# Patient Record
Sex: FEMALE | Race: WHITE | NOT HISPANIC OR LATINO | Employment: OTHER | URBAN - METROPOLITAN AREA
[De-identification: names, ages, dates, MRNs, and addresses within clinical notes are randomized per-mention and may not be internally consistent; named-entity substitution may affect disease eponyms.]

---

## 2017-01-20 PROBLEM — M54.50 CHRONIC BILATERAL LOW BACK PAIN WITHOUT SCIATICA: Status: ACTIVE | Noted: 2017-01-20

## 2017-01-20 PROBLEM — G89.29 CHRONIC BILATERAL LOW BACK PAIN WITHOUT SCIATICA: Status: ACTIVE | Noted: 2017-01-20

## 2017-10-27 ENCOUNTER — TELEPHONE (OUTPATIENT)
Dept: NEUROSURGERY | Facility: CLINIC | Age: 82
End: 2017-10-27

## 2017-10-27 NOTE — TELEPHONE ENCOUNTER
Steven from [facility] [phone] called. Needs follow-up appointment with Dr. Lin. Pt. is being discharged this afternoon. Please call with follow-up appointment so that they can give to patient before discharge.

## 2017-11-22 ENCOUNTER — OFFICE VISIT (OUTPATIENT)
Dept: NEUROSURGERY | Facility: CLINIC | Age: 82
End: 2017-11-22
Payer: MEDICARE

## 2017-11-22 VITALS
SYSTOLIC BLOOD PRESSURE: 135 MMHG | DIASTOLIC BLOOD PRESSURE: 77 MMHG | HEIGHT: 68 IN | HEART RATE: 82 BPM | WEIGHT: 158.19 LBS | BODY MASS INDEX: 23.97 KG/M2

## 2017-11-22 DIAGNOSIS — D32.0 INTRACRANIAL MENINGIOMA: ICD-10-CM

## 2017-11-22 DIAGNOSIS — G93.89 SUPRASELLAR MASS: ICD-10-CM

## 2017-11-22 DIAGNOSIS — D49.7 PITUITARY TUMOR: ICD-10-CM

## 2017-11-22 DIAGNOSIS — F41.9 ANXIETY: ICD-10-CM

## 2017-11-22 DIAGNOSIS — H50.16 EXOTROPIA, ALTERNATING, WITH A PATTERN: Primary | ICD-10-CM

## 2017-11-22 DIAGNOSIS — M54.50 CHRONIC BILATERAL LOW BACK PAIN WITHOUT SCIATICA: ICD-10-CM

## 2017-11-22 DIAGNOSIS — G89.29 CHRONIC BILATERAL LOW BACK PAIN WITHOUT SCIATICA: ICD-10-CM

## 2017-11-22 PROCEDURE — 99215 OFFICE O/P EST HI 40 MIN: CPT | Mod: S$PBB,,, | Performed by: NEUROLOGICAL SURGERY

## 2017-11-22 PROCEDURE — 99999 PR PBB SHADOW E&M-EST. PATIENT-LVL III: CPT | Mod: PBBFAC,,, | Performed by: NEUROLOGICAL SURGERY

## 2017-11-22 PROCEDURE — 99213 OFFICE O/P EST LOW 20 MIN: CPT | Mod: PBBFAC,PN | Performed by: NEUROLOGICAL SURGERY

## 2017-11-22 NOTE — PROGRESS NOTES
Neurosurgery Outpatient Follow Up    Patient ID: Remy Edmond is a 82 y.o. female.    Chief Complaint   Patient presents with    Follow-up     Pt            Review of Systems   Constitutional: Negative for activity change, appetite change, chills, fever and unexpected weight change.   HENT: Negative for tinnitus, trouble swallowing and voice change.    Respiratory: Negative for apnea, cough, chest tightness and shortness of breath.    Cardiovascular: Negative for chest pain and palpitations.   Gastrointestinal: Negative for constipation, diarrhea, nausea and vomiting.   Endocrine:        Thinning hair   Genitourinary: Negative for difficulty urinating, dysuria, frequency and urgency.   Musculoskeletal: Negative for back pain, gait problem, neck pain and neck stiffness.   Skin: Negative for wound.   Neurological: Positive for headaches. Negative for dizziness, tremors, seizures, facial asymmetry, speech difficulty, weakness, light-headedness and numbness.   Psychiatric/Behavioral: Positive for sleep disturbance. Negative for confusion and decreased concentration. The patient is nervous/anxious.        Past Medical History:   Diagnosis Date    Anxiety     Arthritis     GERD (gastroesophageal reflux disease)     Hyperlipidemia     Hypertension     Irritable bladder     Osteopenia 9/12/2016    Plantar fascial fibromatosis     Psychosis     [facility]    Thyroid disease     Vitamin deficiency      Social History     Social History    Marital status: Unknown     Spouse name: N/A    Number of children: N/A    Years of education: N/A     Occupational History    Not on file.     Social History Main Topics    Smoking status: Never Smoker    Smokeless tobacco: Never Used    Alcohol use No    Drug use: No    Sexual activity: Not on file     Other Topics Concern    Not on file     Social History Narrative    No narrative on file     Family History   Problem Relation  "Age of Onset    Hypertension Father     Anuerysm Mother     COPD Brother     Cancer Brother     Amblyopia Neg Hx     Blindness Neg Hx     Cataracts Neg Hx     Glaucoma Neg Hx     Macular degeneration Neg Hx     Retinal detachment Neg Hx     Strabismus Neg Hx      Review of patient's allergies indicates:   Allergen Reactions    Codeine sulfate     Lortab [hydrocodone-acetaminophen]     Morphine     Penicillins     Statins-hmg-coa reductase inhibitors     Tekturna [aliskiren]        Blood pressure 135/77, pulse 82, height 5' 8" (1.727 m), weight 71.7 kg (158 lb 2.9 oz).      Neurologic Exam     Mental Status   Oriented to person, place, and time.   Follows 1 step commands.   Attention: decreased. Concentration: decreased.   Speech: speech is normal   Level of consciousness: alert  Knowledge: consistent with education and poor.   Able to name object. Abnormal comprehension.   Mild cognitive dementia     Cranial Nerves     CN II   Visual acuity: decreased  Right visual field deficit: none  Left visual field deficit: none     CN III, IV, VI   Pupils are equal, round, and reactive to light.  Right pupil: Size: 3 mm. Shape: regular. Reactivity: brisk. Consensual response: intact. Accommodation: intact.   Left pupil: Size: 3 mm. Shape: regular. Reactivity: brisk. Consensual response: intact. Accommodation: intact.   Nystagmus: none   Diplopia: none  Ophthalmoparesis: none  Upgaze: abnormal  Downgaze: abnormal  Conjugate gaze: absent    CN V   Right facial sensation deficit: none  Left facial sensation deficit: none    CN VII   Right facial weakness: none  Left facial weakness: none    CN VIII   Hearing: intact    CN IX, X   CN IX normal.   CN X normal.     CN XI   Right sternocleidomastoid strength: normal  Left sternocleidomastoid strength: normal  Right trapezius strength: normal  Left trapezius strength: normal    CN XII   Fasciculations: absent  Tongue deviation: none    Motor Exam   Muscle bulk: " normal  Overall muscle tone: normal  Right arm pronator drift: absent  Left arm pronator drift: absent    Strength   Right neck flexion: 5/5  Left neck flexion: 5/5  Right neck extension: 5/5  Left neck extension: 5/5  Right deltoid: 5/5  Left deltoid: 5/5  Right biceps: 5/5  Left biceps: 5/5  Right triceps: 5/5  Left triceps: 5/5  Right wrist flexion: 5/5  Left wrist flexion: 5/5  Right wrist extension: 5/5  Left wrist extension: 5/5  Right interossei: 5/5  Left interossei: 5/5  Right abdominals: 5/5  Left abdominals: 5/5  Right iliopsoas: 5/5  Left iliopsoas: 5/5  Right quadriceps: 5/5  Left quadriceps: 5/5  Right hamstrin/5  Left hamstrin/5  Right glutei: 5/5  Left glutei: 5/5  Right anterior tibial: 5/5  Left anterior tibial: 5/5  Right posterior tibial: 5/5  Left posterior tibial: 5/5  Right peroneal: 5/5  Left peroneal: 5/5  Right gastroc: 5/5  Left gastroc: 5/5    Sensory Exam   Light touch normal.   Vibration normal.   Pinprick normal.     Gait, Coordination, and Reflexes     Gait  Gait: normal    Coordination   Romberg: negative  Finger to nose coordination: normal  Heel to shin coordination: normal  Tandem walking coordination: normal    Tremor   Resting tremor: absent  Intention tremor: absent  Action tremor: absent    Reflexes   Right brachioradialis: 2+  Left brachioradialis: 2+  Right biceps: 2+  Left biceps: 2+  Right triceps: 2+  Left triceps: 2+  Right patellar: 2+  Left patellar: 2+  Right achilles: 2+  Left achilles: 2+  Right : 2+  Left : 2+  Right plantar: normal  Left plantar: normal  Right De La Cruz: absent  Left De La Cruz: absent  Right ankle clonus: absent  Left ankle clonus: absent      Physical Exam   Constitutional: She is oriented to person, place, and time. She appears well-developed and well-nourished.   HENT:   Head: Normocephalic and atraumatic.   Eyes: Pupils are equal, round, and reactive to light.   Proptosis OD  OD > OS deviation   Neck: Normal range of motion. Neck  supple.   Cardiovascular: Normal rate and intact distal pulses.    Pulmonary/Chest: Effort normal. No respiratory distress.   Abdominal: Soft. She exhibits no distension.   Musculoskeletal: Normal range of motion. She exhibits no edema or deformity.   Neurological: She is oriented to person, place, and time. She has a normal Finger-Nose-Finger Test, a normal Heel to Shin Test, a normal Romberg Test and a normal Tandem Gait Test. Gait normal.   Reflex Scores:       Tricep reflexes are 2+ on the right side and 2+ on the left side.       Bicep reflexes are 2+ on the right side and 2+ on the left side.       Brachioradialis reflexes are 2+ on the right side and 2+ on the left side.       Patellar reflexes are 2+ on the right side and 2+ on the left side.       Achilles reflexes are 2+ on the right side and 2+ on the left side.  Skin: Skin is warm and dry.   Psychiatric: She has a normal mood and affect. Her speech is normal and behavior is normal. Judgment and thought content normal.   Nursing note and vitals reviewed.      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 The patient has noticed increased visual loss and diplopia. She also suffers with frequent headaches, dizziness and unsteadiness. Over the past few years she has developed some cognitive decline and short term memory loss.    I have reviewed her vital signs and nursing notes. On examination she is  female who looks her stated age and is well appearing. Neurologically she is grossly intact with baseline dementia and unsteadiness. Her only positive finding is dysconjugate gaze and decreased visual acuity better characterized in the ophthalmology notes. She has mild hearing loss.    I have reviewed her new brain MRI which demonstrates a 2.5 cm right parasellar lesion extending  intracranially behind the right SOF. This appears to be a pituitary tumor and is unlikely to be a meningioma. In addition she has the known meningioma in the anterior fall measuring 1.5 cm with minimal mass effect on the surrounding brain. There are involutionary changes in the brain parenchyma with prominent microvascular lacunar infarcts in the basal ganglia bilaterally.    This elderly female who is very high functioning feels she is declining in vision and has increased headaches behind the right eye. Despite her age I believe it is reasonable to receopress the optic chiasm and at least debulk the parasellar/pituitary tumor.  We can follow that with stereotactic radiosurgery depending on the pathology.    Visit Diagnosis:  Exotropia, alternating, with A pattern    Chronic bilateral low back pain without sciatica    Anxiety    Intracranial meningioma    Pituitary tumor    Suprasellar mass      Total time spent counseling greater than fifty percent of total visit time.  Counseling included discussion regarding imaging findings, diagnosis possibilities, treatment options, risks and benefits.   The patient had many questions regarding the options and long-term effects.

## 2017-11-30 DIAGNOSIS — D49.7 PITUITARY TUMOR: Primary | ICD-10-CM

## 2017-12-15 ENCOUNTER — TELEPHONE (OUTPATIENT)
Dept: NEUROSURGERY | Facility: CLINIC | Age: 82
End: 2017-12-15

## 2017-12-15 NOTE — TELEPHONE ENCOUNTER
Pt called stating she will call to schedule her date for surgery and MRI needed when she is ready.

## 2019-01-07 ENCOUNTER — HOSPITAL ENCOUNTER (OUTPATIENT)
Dept: RADIOLOGY | Facility: HOSPITAL | Age: 84
Discharge: HOME | End: 2019-01-07
Attending: OBSTETRICS & GYNECOLOGY
Payer: MEDICARE

## 2019-01-07 ENCOUNTER — OFFICE VISIT (OUTPATIENT)
Dept: OBSTETRICS AND GYNECOLOGY | Facility: CLINIC | Age: 84
End: 2019-01-07
Payer: MEDICARE

## 2019-01-07 VITALS — HEIGHT: 67 IN | WEIGHT: 156 LBS | BODY MASS INDEX: 24.48 KG/M2

## 2019-01-07 VITALS
SYSTOLIC BLOOD PRESSURE: 122 MMHG | WEIGHT: 156.31 LBS | DIASTOLIC BLOOD PRESSURE: 64 MMHG | BODY MASS INDEX: 24.48 KG/M2

## 2019-01-07 DIAGNOSIS — Z12.39 BREAST CANCER SCREENING: ICD-10-CM

## 2019-01-07 DIAGNOSIS — Z12.39 BREAST CANCER SCREENING: Primary | ICD-10-CM

## 2019-01-07 DIAGNOSIS — N34.2 URETHRAL MEATITIS: ICD-10-CM

## 2019-01-07 DIAGNOSIS — Z01.411 ENCOUNTER FOR WELL WOMAN EXAM WITH ABNORMAL FINDINGS: ICD-10-CM

## 2019-01-07 PROCEDURE — 77063 BREAST TOMOSYNTHESIS BI: CPT | Mod: 26,,, | Performed by: RADIOLOGY

## 2019-01-07 PROCEDURE — 77067 SCR MAMMO BI INCL CAD: CPT | Mod: 26,,, | Performed by: RADIOLOGY

## 2019-01-07 PROCEDURE — 77063 MAMMO DIGITAL SCREENING BILAT WITH TOMOSYNTHESIS_CAD: ICD-10-PCS | Mod: 26,,, | Performed by: RADIOLOGY

## 2019-01-07 PROCEDURE — 99999 PR PBB SHADOW E&M-EST. PATIENT-LVL IV: CPT | Mod: PBBFAC,,, | Performed by: OBSTETRICS & GYNECOLOGY

## 2019-01-07 PROCEDURE — 77067 SCR MAMMO BI INCL CAD: CPT | Mod: TC,PN

## 2019-01-07 PROCEDURE — 99204 PR OFFICE/OUTPT VISIT, NEW, LEVL IV, 45-59 MIN: ICD-10-PCS | Mod: S$PBB,,, | Performed by: OBSTETRICS & GYNECOLOGY

## 2019-01-07 PROCEDURE — 99999 PR PBB SHADOW E&M-EST. PATIENT-LVL IV: ICD-10-PCS | Mod: PBBFAC,,, | Performed by: OBSTETRICS & GYNECOLOGY

## 2019-01-07 PROCEDURE — 99204 OFFICE O/P NEW MOD 45 MIN: CPT | Mod: S$PBB,,, | Performed by: OBSTETRICS & GYNECOLOGY

## 2019-01-07 PROCEDURE — 99214 OFFICE O/P EST MOD 30 MIN: CPT | Mod: PBBFAC,PN | Performed by: OBSTETRICS & GYNECOLOGY

## 2019-01-07 PROCEDURE — 77067 MAMMO DIGITAL SCREENING BILAT WITH TOMOSYNTHESIS_CAD: ICD-10-PCS | Mod: 26,,, | Performed by: RADIOLOGY

## 2019-01-07 NOTE — PROGRESS NOTES
Subjective:       Patient ID: Remy Edmond is a 83 y.o. female.    Chief Complaint:  Vaginal Prolapse      History of Present Illness  HPI  83 y.o.  with complaint of tender mass at entrance to vaginal canal. This was first noted 2 weeks ago. Patient reports having had a hysterectomy and possibly also BSO. Patient is a poor historian. Patient reports no dysuria or change in bladder function.    GYN & OB History  No LMP recorded. Patient has had a hysterectomy.   Date of Last Pap: No result found    OB History    Para Term  AB Living   1 1       1   SAB TAB Ectopic Multiple Live Births                  # Outcome Date GA Lbr Thom/2nd Weight Sex Delivery Anes PTL Lv   1 Para                   Review of Systems  Review of Systems   Constitutional: Negative for activity change, appetite change, chills, diaphoresis, fatigue, fever and unexpected weight change.   HENT: Negative for nasal congestion, mouth sores and tinnitus.    Eyes: Negative for discharge and visual disturbance.   Respiratory: Negative for cough, shortness of breath and wheezing.    Cardiovascular: Negative for chest pain, palpitations and leg swelling.   Gastrointestinal: Negative for abdominal pain, bloating, blood in stool, constipation, diarrhea, nausea, vomiting, reflux and fecal incontinence.   Endocrine: Positive for hypothyroidism. Negative for diabetes, hair loss, hot flashes and hyperthyroidism.   Genitourinary: Negative for bladder incontinence, decreased libido, dyspareunia, dysuria, flank pain, frequency, genital sores, hematuria, menorrhagia, menstrual problem, pelvic pain, urgency, vaginal bleeding, vaginal discharge, vaginal pain, dysmenorrhea, urinary incontinence, postcoital bleeding, postmenopausal bleeding and vaginal odor.   Musculoskeletal: Negative for arthralgias, back pain, joint swelling, leg pain and myalgias.   Neurological: Negative for vertigo, seizures, syncope, numbness and headaches.   Hematological:  Negative for adenopathy. Does not bruise/bleed easily.   Psychiatric/Behavioral: Negative for depression and sleep disturbance. The patient is not nervous/anxious.    Breast: Negative for asymmetry, breast self exam, lump, mass, mastodynia, nipple discharge, skin changes and tenderness          Objective:    Physical Exam:   Constitutional: She is oriented to person, place, and time. She appears well-developed and well-nourished. No distress.    HENT:   Head: Normocephalic.   Nose: No epistaxis.     Neck: Normal range of motion.    Cardiovascular: Normal rate.     Pulmonary/Chest: Effort normal.   Breasts: no palpable masses or nipple discharge.        Abdominal: Soft. She exhibits distension. She exhibits no mass. There is no guarding.     Genitourinary:   Genitourinary Comments: No lesions noted on external genitalia. There is apparent edema and erythema of urethral meatus. Vaginal canal is atrophic but well supported.           Musculoskeletal: Normal range of motion and moves all extremeties.       Neurological: She is alert and oriented to person, place, and time.    Skin: Skin is warm and dry.    Psychiatric: She has a normal mood and affect. Her behavior is normal.          Assessment:        1. Encounter for well woman exam with abnormal findings    2. Urethral meatitis                Plan:      Mammogram today  Hydrocortisone cream to urethral meatus and referral to Urogynecology for evaluation

## 2019-01-07 NOTE — LETTER
January 7, 2019      Amandeep Vieira MD  [address]  [facility] [facility] [address]           [facility]  [address] [place] [address]  Phone: [phone]  Fax: [phone]          Patient: Remy Edmond   MR Number: [id number]   YOB: 1935   Date of Visit: 1/7/2019       Dear Dr. Amandeep Vieira:    Thank you for referring Remy Edmond to me for evaluation. Attached you will find relevant portions of my assessment and plan of care.    If you have questions, please do not hesitate to call me. I look forward to following Remy Edmond along with you.    Sincerely,    Wade Rogers MD    Enclosure  CC:  No Recipients    If you would like to receive this communication electronically, please contact [email or web address] or [phone] to request more information on [facility] assistant Link access.    For providers and/or their staff who would like to refer a patient to [facility], please contact us through our one-stop-shop provider referral line, [facility], at [phone].    If you feel you have received this communication in error or would no longer like to receive these types of communications, please e-mail [email or web address]

## 2019-01-11 ENCOUNTER — TELEPHONE (OUTPATIENT)
Dept: RADIOLOGY | Facility: HOSPITAL | Age: 84
End: 2019-01-11

## 2019-01-30 ENCOUNTER — TELEPHONE (OUTPATIENT)
Dept: RADIOLOGY | Facility: HOSPITAL | Age: 84
End: 2019-01-30

## 2019-02-07 ENCOUNTER — HOSPITAL ENCOUNTER (OUTPATIENT)
Dept: RADIOLOGY | Facility: HOSPITAL | Age: 84
Discharge: HOME | End: 2019-02-07
Attending: OBSTETRICS & GYNECOLOGY
Payer: MEDICARE

## 2019-02-07 ENCOUNTER — TELEPHONE (OUTPATIENT)
Dept: RADIOLOGY | Facility: HOSPITAL | Age: 84
End: 2019-02-07

## 2019-02-07 DIAGNOSIS — R92.8 ABNORMAL MAMMOGRAM OF RIGHT BREAST: ICD-10-CM

## 2019-02-07 PROCEDURE — 77065 DX MAMMO INCL CAD UNI: CPT | Mod: TC,PO

## 2019-02-07 PROCEDURE — 76642 ULTRASOUND BREAST LIMITED: CPT | Mod: 26,RT,, | Performed by: RADIOLOGY

## 2019-02-07 PROCEDURE — 77065 DX MAMMO INCL CAD UNI: CPT | Mod: 26,,, | Performed by: RADIOLOGY

## 2019-02-07 PROCEDURE — 77061 BREAST TOMOSYNTHESIS UNI: CPT | Mod: TC,PO

## 2019-02-07 PROCEDURE — 77061 BREAST TOMOSYNTHESIS UNI: CPT | Mod: 26,,, | Performed by: RADIOLOGY

## 2019-02-07 PROCEDURE — 77061 MAMMO DIGITAL DIAGNOSTIC RIGHT WITH TOMOSYNTHESIS_CAD: ICD-10-PCS | Mod: 26,,, | Performed by: RADIOLOGY

## 2019-02-07 PROCEDURE — 76642 US BREAST RIGHT LIMITED: ICD-10-PCS | Mod: 26,RT,, | Performed by: RADIOLOGY

## 2019-02-07 PROCEDURE — 77065 MAMMO DIGITAL DIAGNOSTIC RIGHT WITH TOMOSYNTHESIS_CAD: ICD-10-PCS | Mod: 26,,, | Performed by: RADIOLOGY

## 2019-02-07 PROCEDURE — 76642 ULTRASOUND BREAST LIMITED: CPT | Mod: TC,PO,RT

## 2019-02-07 NOTE — TELEPHONE ENCOUNTER
..Patient notified of diagnostic mammogram results.  Right breast biopsy is scheduled on 2/14/2019

## 2019-02-14 ENCOUNTER — HOSPITAL ENCOUNTER (OUTPATIENT)
Dept: RADIOLOGY | Facility: HOSPITAL | Age: 84
Discharge: HOME | End: 2019-02-14
Attending: OBSTETRICS & GYNECOLOGY
Payer: MEDICARE

## 2019-02-14 DIAGNOSIS — R92.8 ABNORMAL MAMMOGRAM OF RIGHT BREAST: ICD-10-CM

## 2019-02-14 PROCEDURE — 88341 IMHCHEM/IMCYTCHM EA ADD ANTB: CPT | Mod: 26,59,, | Performed by: PATHOLOGY

## 2019-02-14 PROCEDURE — A4648 IMPLANTABLE TISSUE MARKER: HCPCS | Mod: PO

## 2019-02-14 PROCEDURE — 19083 US BREAST BIOPSY WITH IMAGING 1ST SITE RIGHT: ICD-10-PCS | Mod: RT,,, | Performed by: RADIOLOGY

## 2019-02-14 PROCEDURE — 19084 US BREAST BIOPSY WITH IMAGING EA ADDITIONAL: ICD-10-PCS | Mod: RT,,, | Performed by: RADIOLOGY

## 2019-02-14 PROCEDURE — 88360 TUMOR IMMUNOHISTOCHEM/MANUAL: CPT | Mod: 59 | Performed by: PATHOLOGY

## 2019-02-14 PROCEDURE — 88305 TISSUE SPECIMEN TO PATHOLOGY, RADIOLOGY: ICD-10-PCS | Mod: 26,,, | Performed by: PATHOLOGY

## 2019-02-14 PROCEDURE — 27201044 US BREAST BIOPSY WITH IMAGING EA ADDITIONAL: Mod: PO

## 2019-02-14 PROCEDURE — 27201044 US BREAST BIOPSY WITH IMAGING 1ST SITE RIGHT: Mod: PO

## 2019-02-14 PROCEDURE — 88341 PR IHC OR ICC EACH ADD'L SINGLE ANTIBODY  STAINPR: ICD-10-PCS | Mod: 26,59,, | Performed by: PATHOLOGY

## 2019-02-14 PROCEDURE — 88342 IMHCHEM/IMCYTCHM 1ST ANTB: CPT | Mod: 26,59,, | Performed by: PATHOLOGY

## 2019-02-14 PROCEDURE — 88360 TUMOR IMMUNOHISTOCHEM/MANUAL: CPT | Mod: 26,,, | Performed by: PATHOLOGY

## 2019-02-14 PROCEDURE — 19083 BX BREAST 1ST LESION US IMAG: CPT | Mod: RT,,, | Performed by: RADIOLOGY

## 2019-02-14 PROCEDURE — 88342 TISSUE SPECIMEN TO PATHOLOGY, RADIOLOGY: ICD-10-PCS | Mod: 26,59,, | Performed by: PATHOLOGY

## 2019-02-14 PROCEDURE — 88305 TISSUE EXAM BY PATHOLOGIST: CPT | Mod: 26,,, | Performed by: PATHOLOGY

## 2019-02-14 PROCEDURE — 88360 PR  TUMOR IMMUNOHISTOCHEM/MANUAL: ICD-10-PCS | Mod: 26,,, | Performed by: PATHOLOGY

## 2019-02-14 PROCEDURE — 88305 TISSUE EXAM BY PATHOLOGIST: CPT | Performed by: PATHOLOGY

## 2019-02-14 PROCEDURE — 19084 BX BREAST ADD LESION US IMAG: CPT | Mod: RT,,, | Performed by: RADIOLOGY

## 2019-02-22 ENCOUNTER — TELEPHONE (OUTPATIENT)
Dept: RADIOLOGY | Facility: HOSPITAL | Age: 84
End: 2019-02-22

## 2019-02-25 NOTE — TELEPHONE ENCOUNTER
Patient notified of right breast biopsy    FINAL PATHOLOGIC DIAGNOSIS  1. RIGHT BREAST MASS, 12:00, 1 CM FROM NIPPLE, BIOPSY:  -Invasive ductal carcinoma, combined grade 1  Tubule formation: Score 2  Nuclear pleomorphism: Score 2  Mitotic rate: score 1  -Largest continuous tumor measurement on this biopsy is 4 mm  -Immunohistochemical stain for P63 performed with adequate controls supports the diagnosis.  -Immunohistochemical stains for hormone receptors performed with adequate controls show the following results:  Estrogen receptor: Positive, strong staining in more than 90% of tumor cell nuclei.  Progesterone receptor: Positive, strong staining in more than 90% of tumor cell nuclei.  Her2: Negative, score 1+  Ki67: 9%  2. RIGHT BREAST MASS, 12:00, 6 CM FROM NIPPLE, BIOPSY:  -Invasive ductal carcinoma, combined grade 2  Tubule formation: Score 3  Nuclear pleomorphism: Score 2  Mitotic rate: score 1  -Largest continuous tumor measurement on this biopsy is 6 mm  -Immunohistochemical stains for hormone receptors performed with adequate controls show the following results:  Estrogen receptor: Positive, strong staining in more than 90% of tumor cell nuclei.  Progesterone receptor: Positive, strong staining in more than 90% of tumor cell nuclei.  Her2: Negative, score 1+  XRK8DOG,ER,PGR,CTS8ZEB,ER,PGR,QUT6CCP,ER,PGR  Report continued on next page Page 1 of 3  Patient Name BUFFY GREY Accession # [id number]  (83Y F)  Location  Date of Birth  [id number]  1935  Billing #  Collection Date  Received  Reported  Medical Record #  02/14/2019  02/15/2019  02/21/2019  [facility]  [id number]  Ki67: 25%  3. RIGHT BREAST MASS, 9:00, 8 CM FROM NIPPLE, BIOPSY:  -Invasive ductal carcinoma, combined grade 1  Tubule formation: Score 2  Nuclear pleomorphism: Score 2  Mitotic rate: score 1  -Largest continuous tumor measurement on this biopsy is 6 mm.  -Immunohistochemical stains for hormone receptors performed with adequate  controls show the following results:  Estrogen receptor: Positive, strong staining in more than 90% of tumor cell nuclei.  Progesterone receptor: Positive, strong staining in more than 90% of tumor cell nuclei.  Her2: Negative, score 1+  Ki67: 5%    Appointment scheduled with Dr Ma on 3/11/2019

## 2019-03-11 ENCOUNTER — OFFICE VISIT (OUTPATIENT)
Dept: SURGERY | Facility: CLINIC | Age: 84
End: 2019-03-11
Payer: MEDICARE

## 2019-03-11 VITALS
HEART RATE: 67 BPM | BODY MASS INDEX: 23.45 KG/M2 | DIASTOLIC BLOOD PRESSURE: 73 MMHG | WEIGHT: 149.69 LBS | SYSTOLIC BLOOD PRESSURE: 159 MMHG

## 2019-03-11 DIAGNOSIS — C50.911 BREAST CANCER, RIGHT BREAST: ICD-10-CM

## 2019-03-11 DIAGNOSIS — Z01.818 PREOP TESTING: ICD-10-CM

## 2019-03-11 DIAGNOSIS — C50.911 MALIGNANT NEOPLASM OF RIGHT FEMALE BREAST, UNSPECIFIED ESTROGEN RECEPTOR STATUS, UNSPECIFIED SITE OF BREAST: Primary | ICD-10-CM

## 2019-03-11 PROCEDURE — 99999 PR PBB SHADOW E&M-EST. PATIENT-LVL IV: CPT | Mod: PBBFAC,,, | Performed by: SURGERY

## 2019-03-11 PROCEDURE — 99204 OFFICE O/P NEW MOD 45 MIN: CPT | Mod: S$PBB,,, | Performed by: SURGERY

## 2019-03-11 PROCEDURE — 99999 PR PBB SHADOW E&M-EST. PATIENT-LVL IV: ICD-10-PCS | Mod: PBBFAC,,, | Performed by: SURGERY

## 2019-03-11 PROCEDURE — 99204 PR OFFICE/OUTPT VISIT, NEW, LEVL IV, 45-59 MIN: ICD-10-PCS | Mod: S$PBB,,, | Performed by: SURGERY

## 2019-03-11 PROCEDURE — 99214 OFFICE O/P EST MOD 30 MIN: CPT | Mod: PBBFAC,PO | Performed by: SURGERY

## 2019-03-11 NOTE — LETTER
March 18, 2019      Wade Rogers MD  [address]  [place] [facility] [address]           [facility]  [address] [facility] [address] [place] [address]  Phone: [phone]          Patient: Remy Edmond   MR Number: [id number]   YOB: 1935   Date of Visit: 3/11/2019       Dear Dr. Wade Rogers:    Thank you for referring Remy Edmond to me for evaluation. Attached you will find relevant portions of my assessment and plan of care.    If you have questions, please do not hesitate to call me. I look forward to following Remy Edmond along with you.    Sincerely,    Porter Ma MD    Enclosure  CC:  No Recipients    If you would like to receive this communication electronically, please contact [email or web address] or [phone] to request more information on [facility] Link access.    For providers and/or their staff who would like to refer a patient to [facility], please contact us through our one-stop-shop provider referral line, [facility], at [phone].    If you feel you have received this communication in error or would no longer like to receive these types of communications, please e-mail [email or web address]

## 2019-03-12 ENCOUNTER — TELEPHONE (OUTPATIENT)
Dept: SURGERY | Facility: CLINIC | Age: 84
End: 2019-03-12

## 2019-03-12 NOTE — TELEPHONE ENCOUNTER
----- Message from Astrid Szymanski sent at 3/12/2019  1:37 PM CDT -----  Type: Needs Medical Advice  Who Called:  Self   Symptoms (please be specific):  NA   How long has patient had these symptoms:  ROBERT  Pharmacy name and phone #:  ROBERT  Best Call Back Number:[phone]  Additional Information:patient asking to speak with the nurse

## 2019-03-13 RX ORDER — SODIUM CHLORIDE 9 MG/ML
INJECTION, SOLUTION INTRAVENOUS CONTINUOUS
Status: CANCELLED | OUTPATIENT
Start: 2019-03-29

## 2019-03-13 RX ORDER — LIDOCAINE HYDROCHLORIDE 10 MG/ML
1 INJECTION, SOLUTION EPIDURAL; INFILTRATION; INTRACAUDAL; PERINEURAL ONCE
Status: CANCELLED | OUTPATIENT
Start: 2019-03-29

## 2019-03-18 ENCOUNTER — LAB VISIT (OUTPATIENT)
Dept: LAB | Facility: HOSPITAL | Age: 84
End: 2019-03-18
Attending: SURGERY
Payer: MEDICARE

## 2019-03-18 DIAGNOSIS — Z01.818 PREOP TESTING: ICD-10-CM

## 2019-03-18 LAB
ALBUMIN SERPL BCP-MCNC: 3.8 G/DL
ALP SERPL-CCNC: 82 U/L
ALT SERPL W/O P-5'-P-CCNC: 24 U/L
ANION GAP SERPL CALC-SCNC: 7 MMOL/L
AST SERPL-CCNC: 33 U/L
BASOPHILS # BLD AUTO: 0.04 K/UL
BASOPHILS NFR BLD: 0.7 %
BILIRUB SERPL-MCNC: 0.5 MG/DL
BUN SERPL-MCNC: 22 MG/DL
CALCIUM SERPL-MCNC: 9.3 MG/DL
CHLORIDE SERPL-SCNC: 106 MMOL/L
CO2 SERPL-SCNC: 31 MMOL/L
CREAT SERPL-MCNC: 1.1 MG/DL
DIFFERENTIAL METHOD: ABNORMAL
EOSINOPHIL # BLD AUTO: 0.2 K/UL
EOSINOPHIL NFR BLD: 2.9 %
ERYTHROCYTE [DISTWIDTH] IN BLOOD BY AUTOMATED COUNT: 12.9 %
EST. GFR  (AFRICAN AMERICAN): 54.4 ML/MIN/1.73 M^2
EST. GFR  (NON AFRICAN AMERICAN): 47.2 ML/MIN/1.73 M^2
GLUCOSE SERPL-MCNC: 101 MG/DL
HCT VFR BLD AUTO: 41.2 %
HGB BLD-MCNC: 13.5 G/DL
IMM GRANULOCYTES # BLD AUTO: 0.01 K/UL
IMM GRANULOCYTES NFR BLD AUTO: 0.2 %
LYMPHOCYTES # BLD AUTO: 1.7 K/UL
LYMPHOCYTES NFR BLD: 29 %
MCH RBC QN AUTO: 33.3 PG
MCHC RBC AUTO-ENTMCNC: 32.8 G/DL
MCV RBC AUTO: 102 FL
MONOCYTES # BLD AUTO: 0.5 K/UL
MONOCYTES NFR BLD: 8.3 %
NEUTROPHILS # BLD AUTO: 3.5 K/UL
NEUTROPHILS NFR BLD: 58.9 %
NRBC BLD-RTO: 0 /100 WBC
PLATELET # BLD AUTO: 227 K/UL
PMV BLD AUTO: 10.7 FL
POTASSIUM SERPL-SCNC: 4.2 MMOL/L
PROT SERPL-MCNC: 7.2 G/DL
RBC # BLD AUTO: 4.05 M/UL
SODIUM SERPL-SCNC: 144 MMOL/L
WBC # BLD AUTO: 5.93 K/UL

## 2019-03-18 PROCEDURE — 93010 ELECTROCARDIOGRAM REPORT: CPT | Mod: S$PBB,,, | Performed by: INTERNAL MEDICINE

## 2019-03-18 PROCEDURE — 93010 EKG 12-LEAD: ICD-10-PCS | Mod: S$PBB,,, | Performed by: INTERNAL MEDICINE

## 2019-03-18 PROCEDURE — 36415 COLL VENOUS BLD VENIPUNCTURE: CPT | Mod: PO

## 2019-03-18 PROCEDURE — 85025 COMPLETE CBC W/AUTO DIFF WBC: CPT

## 2019-03-18 PROCEDURE — 93005 ELECTROCARDIOGRAM TRACING: CPT | Mod: PBBFAC,PO | Performed by: INTERNAL MEDICINE

## 2019-03-18 PROCEDURE — 80053 COMPREHEN METABOLIC PANEL: CPT

## 2019-03-18 NOTE — H&P (VIEW-ONLY)
Subjective:       Patient ID: Remy Edmond is a 83 y.o. female.    Chief Complaint: Consult and Breast Problem      See      Pt with recently diagnosed multicentric invasive right breast cancer. No previous breast problems. Family history is negative. She has one child born when she was 30. She is here with her son.    Past Medical History:   Diagnosis Date    Anxiety     Arthritis     GERD (gastroesophageal reflux disease)     Hyperlipidemia     Hypertension     Irritable bladder     Osteopenia 9/12/2016    Plantar fascial fibromatosis     Psychosis     [facility]    Thyroid disease     Vitamin deficiency      Past Surgical History:   Procedure Laterality Date    APPENDECTOMY      BACK SURGERY      CATARACT EXTRACTION W/  INTRAOCULAR LENS IMPLANT Bilateral 1999    HYSTERECTOMY      TONSILLECTOMY           Current Outpatient Medications:     amlodipine-benazepril (LOTREL) 10-40 mg per capsule, TAKE 1 CAPSULE BY MOUTH ONCE DAILY PATIENT NEEDS AN APPOINTMENT FOR FURTHER REFILLS Disp: 30 capsule, Rfl: 5    atenolol (TENORMIN) 50 MG tablet, Take 50 mg by mouth once daily., Disp: , Rfl:     cholecalciferol, vitamin D3, (VITAMIN D3) 1,000 unit capsule, Take 1,000 Units by mouth once daily., Disp: , Rfl:     doxazosin (CARDURA) 1 MG tablet, TAKE 1 TABLET BY MOUTH IN THE EVENING, Disp: 30 tablet, Rfl: 5    gabapentin (NEURONTIN) 100 MG capsule, TAKE 1 CAPSULE BY MOUTH 4 TIMES DAILY, Disp: 120 capsule, Rfl: 5    naproxen sodium (ANAPROX) 550 MG tablet, Take 1 tablet (550 mg total) by mouth 2 (two) times daily with meals., Disp: 20 tablet, Rfl: 0    SYNTHROID 175 mcg tablet, TAKE 1 TABLET BY MOUTH ONCE DAILY, Disp: 30 tablet, Rfl: 3    atenolol (TENORMIN) 50 MG tablet, TAKE 1 TABLET BY MOUTH TWICE DAILY, Disp: 60 tablet, Rfl: 5    Review of patient's allergies indicates:   Allergen Reactions    Codeine sulfate     Lortab [hydrocodone-acetaminophen]     Morphine      Penicillins     Statins-hmg-coa reductase inhibitors     T[place]turna [aliskiren]        Family History   Problem Relation Age of Onset    Hypertension Father     Anuerysm Mother     COPD Brother     Cancer Brother     Amblyopia Neg Hx     Blindness Neg Hx     Cataracts Neg Hx     Glaucoma Neg Hx     Macular degeneration Neg Hx     Retinal detachment Neg Hx     Strabismus Neg Hx      Social History     Socioeconomic History    Marital status: Unknown     Spouse name: Not on file    Number of children: Not on file    Years of education: Not on file    Highest education level: Not on file   Social Needs    Financial resource strain: Not on file    Food insecurity - worry: Not on file    Food insecurity - inability: Not on file    Transportation needs - medical: Not on file    Transportation needs - non-medical: Not on file   Occupational History    Not on file   Tobacco Use    Smoking status: Never Smoker    Smokeless tobacco: Never Used   Substance and Sexual Activity    Alcohol use: No    Drug use: No    Sexual activity: Not on file   Other Topics Concern    Not on file   Social History Narrative    Not on file       Review of Systems   Constitutional: Negative for activity change, chills, fever and unexpected weight change.   HENT: Negative for congestion, sore throat, trouble swallowing and voice change.    Eyes: Negative for redness and visual disturbance.   Respiratory: Negative for cough, shortness of breath and wheezing.    Cardiovascular: Negative for chest pain and palpitations.   Gastrointestinal: Negative for abdominal pain, blood in stool, nausea and vomiting.   Endocrine: Negative.    Genitourinary: Negative for dysuria, frequency and hematuria.   Musculoskeletal: Negative for arthralgias, back pain and neck pain.   Skin: Negative for rash and wound.   Allergic/Immunologic: Negative.    Neurological: Negative for dizziness, weakness and headaches.   Hematological: Negative  for adenopathy.   Psychiatric/Behavioral: Negative for agitation and dysphoric mood. The patient is not nervous/anxious.      Objective:     Physical Exam   Constitutional: She is oriented to person, place, and time. She appears well-developed and well-nourished. No distress.   HENT:   Head: Normocephalic and atraumatic.   Mouth/Throat: Oropharynx is clear and moist. No oropharyngeal exudate.   Eyes: Conjunctivae and EOM are normal. Pupils are equal, round, and reactive to light. No scleral icterus.   Neck: Normal range of motion. No thyromegaly present.   Cardiovascular: Normal rate and regular rhythm.   No murmur heard.  Pulmonary/Chest: Effort normal and breath sounds normal. She has no wheezes. She has no rales.   Right Breast Exam:  There are no palpable dominant masses.  There are no overlying skin changes.  There is no contour change of the breast.  There is no nipple discharge.  There is no significant breast tenderness.  There is no palpable axillary or supraclavicular adenopathy.    There is no abnormality detected on physical exam of the contralateral breast.   Abdominal: Soft. Bowel sounds are normal. She exhibits no distension and no mass. There is no tenderness. No hernia.   Musculoskeletal: Normal range of motion. She exhibits no edema.   Lymphadenopathy:     She has no cervical adenopathy.   Neurological: She is alert and oriented to person, place, and time. No cranial nerve deficit.   Skin: Skin is warm and dry. No rash noted. No erythema.   Psychiatric: She has a normal mood and affect. Her behavior is normal.     Assessment:     Encounter Diagnoses   Name Primary?    Preop testing     Malignant neoplasm of right female breast, unspecified estrogen receptor status, unspecified site of breast Yes    Breast cancer, right breast        Plan:      1. Pt will need a right mastectomy with sentinel node biopsy. She does not want to consider reconstruction.  2. Risks and benefits of the planned  procedure were discussed at length with the patient.  Risks and benefits of not proceeding with the procedure were discussed as well. All questions were answered. The patient expressed clear understanding and would like to proceed with the procedure as discussed.

## 2019-03-28 ENCOUNTER — ANESTHESIA EVENT (OUTPATIENT)
Dept: SURGERY | Facility: HOSPITAL | Age: 84
End: 2019-03-28
Payer: MEDICARE

## 2019-03-29 ENCOUNTER — HOSPITAL ENCOUNTER (OUTPATIENT)
Dept: RADIOLOGY | Facility: HOSPITAL | Age: 84
Discharge: HOME | End: 2019-03-29
Attending: SURGERY
Payer: MEDICARE

## 2019-03-29 ENCOUNTER — ANESTHESIA (OUTPATIENT)
Dept: SURGERY | Facility: HOSPITAL | Age: 84
End: 2019-03-29
Payer: MEDICARE

## 2019-03-29 ENCOUNTER — HOSPITAL ENCOUNTER (OUTPATIENT)
Facility: HOSPITAL | Age: 84
Discharge: HOME | End: 2019-03-30
Attending: SURGERY | Admitting: SURGERY
Payer: MEDICARE

## 2019-03-29 DIAGNOSIS — C50.911 BREAST CANCER, RIGHT BREAST: ICD-10-CM

## 2019-03-29 DIAGNOSIS — C50.911 MALIGNANT NEOPLASM OF RIGHT FEMALE BREAST, UNSPECIFIED ESTROGEN RECEPTOR STATUS, UNSPECIFIED SITE OF BREAST: ICD-10-CM

## 2019-03-29 PROCEDURE — 25000003 PHARM REV CODE 250: Performed by: NURSE ANESTHETIST, CERTIFIED REGISTERED

## 2019-03-29 PROCEDURE — 38792 NM SENTINEL LYMPH NODE INJECTION ONLY_RAD PERFORMED: ICD-10-PCS | Mod: RT,,, | Performed by: RADIOLOGY

## 2019-03-29 PROCEDURE — 63600175 PHARM REV CODE 636 W HCPCS: Performed by: SURGERY

## 2019-03-29 PROCEDURE — 71000039 HC RECOVERY, EACH ADD'L HOUR: Performed by: SURGERY

## 2019-03-29 PROCEDURE — 37000009 HC ANESTHESIA EA ADD 15 MINS: Performed by: SURGERY

## 2019-03-29 PROCEDURE — 88342 IMHCHEM/IMCYTCHM 1ST ANTB: CPT | Mod: 59 | Performed by: PATHOLOGY

## 2019-03-29 PROCEDURE — 38792 RA TRACER ID OF SENTINL NODE: CPT | Mod: RT,,, | Performed by: RADIOLOGY

## 2019-03-29 PROCEDURE — 88331 TISSUE SPECIMEN TO PATHOLOGY - SURGERY: ICD-10-PCS | Mod: 26,,, | Performed by: PATHOLOGY

## 2019-03-29 PROCEDURE — 38525 BIOPSY/REMOVAL LYMPH NODES: CPT | Mod: 51,LT,, | Performed by: SURGERY

## 2019-03-29 PROCEDURE — 25000003 PHARM REV CODE 250: Performed by: SURGERY

## 2019-03-29 PROCEDURE — 38525 PR BIOPSY/REM LYMPH NODES, AXILLARY: ICD-10-PCS | Mod: 51,LT,, | Performed by: SURGERY

## 2019-03-29 PROCEDURE — C1729 CATH, DRAINAGE: HCPCS | Performed by: SURGERY

## 2019-03-29 PROCEDURE — 94799 UNLISTED PULMONARY SVC/PX: CPT

## 2019-03-29 PROCEDURE — D9220A PRA ANESTHESIA: ICD-10-PCS | Mod: ANES,,, | Performed by: ANESTHESIOLOGY

## 2019-03-29 PROCEDURE — D9220A PRA ANESTHESIA: ICD-10-PCS | Mod: CRNA,,, | Performed by: NURSE ANESTHETIST, CERTIFIED REGISTERED

## 2019-03-29 PROCEDURE — 88341 IMHCHEM/IMCYTCHM EA ADD ANTB: CPT | Mod: 26,,, | Performed by: PATHOLOGY

## 2019-03-29 PROCEDURE — D9220A PRA ANESTHESIA: Mod: CRNA,,, | Performed by: NURSE ANESTHETIST, CERTIFIED REGISTERED

## 2019-03-29 PROCEDURE — D9220A PRA ANESTHESIA: Mod: ANES,,, | Performed by: ANESTHESIOLOGY

## 2019-03-29 PROCEDURE — 71000033 HC RECOVERY, INTIAL HOUR: Performed by: SURGERY

## 2019-03-29 PROCEDURE — 38900 PR INTRAOPERATIVE SENTINEL LYMPH NODE ID W DYE INJECTION: ICD-10-PCS | Mod: LT,,, | Performed by: SURGERY

## 2019-03-29 PROCEDURE — 36000707: Performed by: SURGERY

## 2019-03-29 PROCEDURE — 19303 PR MASTECTOMY, SIMPLE, COMPLETE: ICD-10-PCS | Mod: LT,,, | Performed by: SURGERY

## 2019-03-29 PROCEDURE — 94761 N-INVAS EAR/PLS OXIMETRY MLT: CPT

## 2019-03-29 PROCEDURE — 88307 TISSUE SPECIMEN TO PATHOLOGY - SURGERY: ICD-10-PCS | Mod: 26,,, | Performed by: PATHOLOGY

## 2019-03-29 PROCEDURE — 38792 RA TRACER ID OF SENTINL NODE: CPT | Mod: TC

## 2019-03-29 PROCEDURE — 99900104 DSU ONLY-NO CHARGE-EA ADD'L HR (STAT): Performed by: SURGERY

## 2019-03-29 PROCEDURE — 27000221 HC OXYGEN, UP TO 24 HOURS

## 2019-03-29 PROCEDURE — 36000706: Performed by: SURGERY

## 2019-03-29 PROCEDURE — 63600175 PHARM REV CODE 636 W HCPCS: Performed by: NURSE ANESTHETIST, CERTIFIED REGISTERED

## 2019-03-29 PROCEDURE — 88331 PATH CONSLTJ SURG 1 BLK 1SPC: CPT | Mod: 26,,, | Performed by: PATHOLOGY

## 2019-03-29 PROCEDURE — 88342 TISSUE SPECIMEN TO PATHOLOGY - SURGERY: ICD-10-PCS | Mod: 26,,, | Performed by: PATHOLOGY

## 2019-03-29 PROCEDURE — 88307 TISSUE EXAM BY PATHOLOGIST: CPT | Mod: 26,,, | Performed by: PATHOLOGY

## 2019-03-29 PROCEDURE — S0077 INJECTION, CLINDAMYCIN PHOSP: HCPCS | Performed by: SURGERY

## 2019-03-29 PROCEDURE — 37000008 HC ANESTHESIA 1ST 15 MINUTES: Performed by: SURGERY

## 2019-03-29 PROCEDURE — 27201423 OPTIME MED/SURG SUP & DEVICES STERILE SUPPLY: Performed by: SURGERY

## 2019-03-29 PROCEDURE — 38900 IO MAP OF SENT LYMPH NODE: CPT | Mod: LT,,, | Performed by: SURGERY

## 2019-03-29 PROCEDURE — 88341 PR IHC OR ICC EACH ADD'L SINGLE ANTIBODY  STAINPR: ICD-10-PCS | Mod: 26,,, | Performed by: PATHOLOGY

## 2019-03-29 PROCEDURE — 99900103 DSU ONLY-NO CHARGE-INITIAL HR (STAT): Performed by: SURGERY

## 2019-03-29 PROCEDURE — 19303 MAST SIMPLE COMPLETE: CPT | Mod: LT,,, | Performed by: SURGERY

## 2019-03-29 PROCEDURE — 25000003 PHARM REV CODE 250: Performed by: ANESTHESIOLOGY

## 2019-03-29 RX ORDER — ROCURONIUM BROMIDE 10 MG/ML
INJECTION, SOLUTION INTRAVENOUS
Status: DISCONTINUED | OUTPATIENT
Start: 2019-03-29 | End: 2019-03-29

## 2019-03-29 RX ORDER — ONDANSETRON 2 MG/ML
4 INJECTION INTRAMUSCULAR; INTRAVENOUS EVERY 12 HOURS PRN
Status: DISCONTINUED | OUTPATIENT
Start: 2019-03-29 | End: 2019-03-30 | Stop reason: HOSPADM

## 2019-03-29 RX ORDER — DEXAMETHASONE SODIUM PHOSPHATE 4 MG/ML
INJECTION, SOLUTION INTRA-ARTICULAR; INTRALESIONAL; INTRAMUSCULAR; INTRAVENOUS; SOFT TISSUE
Status: DISCONTINUED | OUTPATIENT
Start: 2019-03-29 | End: 2019-03-29

## 2019-03-29 RX ORDER — MIDAZOLAM HYDROCHLORIDE 1 MG/ML
INJECTION, SOLUTION INTRAMUSCULAR; INTRAVENOUS
Status: DISCONTINUED | OUTPATIENT
Start: 2019-03-29 | End: 2019-03-29

## 2019-03-29 RX ORDER — HYDROMORPHONE HYDROCHLORIDE 2 MG/ML
0.2 INJECTION, SOLUTION INTRAMUSCULAR; INTRAVENOUS; SUBCUTANEOUS EVERY 5 MIN PRN
Status: DISCONTINUED | OUTPATIENT
Start: 2019-03-29 | End: 2019-03-29 | Stop reason: HOSPADM

## 2019-03-29 RX ORDER — PROPOFOL 10 MG/ML
VIAL (ML) INTRAVENOUS
Status: DISCONTINUED | OUTPATIENT
Start: 2019-03-29 | End: 2019-03-29

## 2019-03-29 RX ORDER — HYDROCODONE BITARTRATE AND ACETAMINOPHEN 5; 325 MG/1; MG/1
1 TABLET ORAL EVERY 6 HOURS PRN
Qty: 10 TABLET | Refills: 0 | Status: SHIPPED | OUTPATIENT
Start: 2019-03-29 | End: 2020-03-05

## 2019-03-29 RX ORDER — LIDOCAINE HYDROCHLORIDE 10 MG/ML
5 INJECTION, SOLUTION EPIDURAL; INFILTRATION; INTRACAUDAL; PERINEURAL ONCE
Status: DISCONTINUED | OUTPATIENT
Start: 2019-03-29 | End: 2019-03-29 | Stop reason: HOSPADM

## 2019-03-29 RX ORDER — OXYCODONE HYDROCHLORIDE 5 MG/1
5 TABLET ORAL
Status: DISCONTINUED | OUTPATIENT
Start: 2019-03-29 | End: 2019-03-29 | Stop reason: HOSPADM

## 2019-03-29 RX ORDER — ONDANSETRON HYDROCHLORIDE 2 MG/ML
INJECTION, SOLUTION INTRAMUSCULAR; INTRAVENOUS
Status: DISCONTINUED | OUTPATIENT
Start: 2019-03-29 | End: 2019-03-29

## 2019-03-29 RX ORDER — BUPIVACAINE HYDROCHLORIDE AND EPINEPHRINE 2.5; 5 MG/ML; UG/ML
INJECTION, SOLUTION EPIDURAL; INFILTRATION; INTRACAUDAL; PERINEURAL
Status: DISCONTINUED | OUTPATIENT
Start: 2019-03-29 | End: 2019-03-29 | Stop reason: HOSPADM

## 2019-03-29 RX ORDER — LIDOCAINE HCL/PF 100 MG/5ML
SYRINGE (ML) INTRAVENOUS
Status: DISCONTINUED | OUTPATIENT
Start: 2019-03-29 | End: 2019-03-29

## 2019-03-29 RX ORDER — ACETAMINOPHEN 325 MG/1
650 TABLET ORAL EVERY 6 HOURS PRN
Status: DISCONTINUED | OUTPATIENT
Start: 2019-03-29 | End: 2019-03-30 | Stop reason: HOSPADM

## 2019-03-29 RX ORDER — ATENOLOL 25 MG/1
50 TABLET ORAL 2 TIMES DAILY
Status: DISCONTINUED | OUTPATIENT
Start: 2019-03-29 | End: 2019-03-30 | Stop reason: HOSPADM

## 2019-03-29 RX ORDER — PHENYLEPHRINE HYDROCHLORIDE 10 MG/ML
INJECTION INTRAVENOUS
Status: DISCONTINUED | OUTPATIENT
Start: 2019-03-29 | End: 2019-03-29

## 2019-03-29 RX ORDER — EPHEDRINE SULFATE 50 MG/ML
INJECTION, SOLUTION INTRAVENOUS
Status: DISCONTINUED | OUTPATIENT
Start: 2019-03-29 | End: 2019-03-29

## 2019-03-29 RX ORDER — SODIUM CHLORIDE 9 MG/ML
INJECTION, SOLUTION INTRAVENOUS CONTINUOUS
Status: DISCONTINUED | OUTPATIENT
Start: 2019-03-29 | End: 2019-03-29

## 2019-03-29 RX ORDER — SUCCINYLCHOLINE CHLORIDE 20 MG/ML
INJECTION INTRAMUSCULAR; INTRAVENOUS
Status: DISCONTINUED | OUTPATIENT
Start: 2019-03-29 | End: 2019-03-29

## 2019-03-29 RX ORDER — PANTOPRAZOLE SODIUM 40 MG/10ML
40 INJECTION, POWDER, LYOPHILIZED, FOR SOLUTION INTRAVENOUS
Status: DISCONTINUED | OUTPATIENT
Start: 2019-03-30 | End: 2019-03-30 | Stop reason: HOSPADM

## 2019-03-29 RX ORDER — GABAPENTIN 100 MG/1
100 CAPSULE ORAL 3 TIMES DAILY
Status: DISCONTINUED | OUTPATIENT
Start: 2019-03-29 | End: 2019-03-30 | Stop reason: HOSPADM

## 2019-03-29 RX ORDER — CLINDAMYCIN PHOSPHATE 900 MG/50ML
900 INJECTION, SOLUTION INTRAVENOUS
Status: COMPLETED | OUTPATIENT
Start: 2019-03-29 | End: 2019-03-29

## 2019-03-29 RX ORDER — SODIUM CHLORIDE 9 MG/ML
INJECTION, SOLUTION INTRAVENOUS CONTINUOUS
Status: DISCONTINUED | OUTPATIENT
Start: 2019-03-29 | End: 2019-03-30 | Stop reason: HOSPADM

## 2019-03-29 RX ORDER — FENTANYL CITRATE 50 UG/ML
25 INJECTION, SOLUTION INTRAMUSCULAR; INTRAVENOUS EVERY 5 MIN PRN
Status: DISCONTINUED | OUTPATIENT
Start: 2019-03-29 | End: 2019-03-29 | Stop reason: HOSPADM

## 2019-03-29 RX ORDER — FENTANYL CITRATE 50 UG/ML
INJECTION, SOLUTION INTRAMUSCULAR; INTRAVENOUS
Status: DISCONTINUED | OUTPATIENT
Start: 2019-03-29 | End: 2019-03-29

## 2019-03-29 RX ORDER — DOXAZOSIN 1 MG/1
1 TABLET ORAL NIGHTLY
Status: DISCONTINUED | OUTPATIENT
Start: 2019-03-29 | End: 2019-03-30 | Stop reason: HOSPADM

## 2019-03-29 RX ORDER — LIDOCAINE HYDROCHLORIDE 10 MG/ML
1 INJECTION, SOLUTION EPIDURAL; INFILTRATION; INTRACAUDAL; PERINEURAL ONCE
Status: COMPLETED | OUTPATIENT
Start: 2019-03-29 | End: 2019-03-29

## 2019-03-29 RX ORDER — SODIUM CHLORIDE, SODIUM LACTATE, POTASSIUM CHLORIDE, CALCIUM CHLORIDE 600; 310; 30; 20 MG/100ML; MG/100ML; MG/100ML; MG/100ML
INJECTION, SOLUTION INTRAVENOUS CONTINUOUS
Status: DISCONTINUED | OUTPATIENT
Start: 2019-03-29 | End: 2019-03-29

## 2019-03-29 RX ORDER — METHYLENE BLUE 10 MG/ML
INJECTION INTRAVENOUS
Status: DISCONTINUED | OUTPATIENT
Start: 2019-03-29 | End: 2019-03-29 | Stop reason: HOSPADM

## 2019-03-29 RX ORDER — GLYCOPYRROLATE 0.2 MG/ML
INJECTION INTRAMUSCULAR; INTRAVENOUS
Status: DISCONTINUED | OUTPATIENT
Start: 2019-03-29 | End: 2019-03-29

## 2019-03-29 RX ORDER — HYDROCODONE BITARTRATE AND ACETAMINOPHEN 5; 325 MG/1; MG/1
1 TABLET ORAL EVERY 4 HOURS PRN
Status: DISCONTINUED | OUTPATIENT
Start: 2019-03-29 | End: 2019-03-30 | Stop reason: HOSPADM

## 2019-03-29 RX ADMIN — SUCCINYLCHOLINE CHLORIDE 100 MG: 20 INJECTION, SOLUTION INTRAMUSCULAR; INTRAVENOUS at 07:03

## 2019-03-29 RX ADMIN — SODIUM CHLORIDE: 0.9 INJECTION, SOLUTION INTRAVENOUS at 10:03

## 2019-03-29 RX ADMIN — DOXAZOSIN 1 MG: 1 TABLET ORAL at 08:03

## 2019-03-29 RX ADMIN — HYDROCODONE BITARTRATE AND ACETAMINOPHEN 1 TABLET: 5; 325 TABLET ORAL at 10:03

## 2019-03-29 RX ADMIN — PROPOFOL 130 MG: 10 INJECTION, EMULSION INTRAVENOUS at 07:03

## 2019-03-29 RX ADMIN — MIDAZOLAM 1 MG: 1 INJECTION INTRAMUSCULAR; INTRAVENOUS at 07:03

## 2019-03-29 RX ADMIN — DEXAMETHASONE SODIUM PHOSPHATE 4 MG: 4 INJECTION, SOLUTION INTRAMUSCULAR; INTRAVENOUS at 07:03

## 2019-03-29 RX ADMIN — PROPOFOL 50 MG: 10 INJECTION, EMULSION INTRAVENOUS at 07:03

## 2019-03-29 RX ADMIN — ROCURONIUM BROMIDE 5 MG: 10 INJECTION, SOLUTION INTRAVENOUS at 07:03

## 2019-03-29 RX ADMIN — LIDOCAINE HYDROCHLORIDE 10 MG: 10 INJECTION, SOLUTION EPIDURAL; INFILTRATION; INTRACAUDAL; PERINEURAL at 06:03

## 2019-03-29 RX ADMIN — ATENOLOL 50 MG: 25 TABLET ORAL at 08:03

## 2019-03-29 RX ADMIN — GABAPENTIN 100 MG: 100 CAPSULE ORAL at 04:03

## 2019-03-29 RX ADMIN — ONDANSETRON 4 MG: 2 INJECTION, SOLUTION INTRAMUSCULAR; INTRAVENOUS at 07:03

## 2019-03-29 RX ADMIN — EPHEDRINE SULFATE 25 MG: 50 INJECTION, SOLUTION INTRAMUSCULAR; INTRAVENOUS; SUBCUTANEOUS at 07:03

## 2019-03-29 RX ADMIN — CLINDAMYCIN PHOSPHATE 900 MG: 18 INJECTION, SOLUTION INTRAVENOUS at 07:03

## 2019-03-29 RX ADMIN — GABAPENTIN 100 MG: 100 CAPSULE ORAL at 08:03

## 2019-03-29 RX ADMIN — LIDOCAINE HYDROCHLORIDE 100 MG: 20 INJECTION, SOLUTION INTRAVENOUS at 07:03

## 2019-03-29 RX ADMIN — PHENYLEPHRINE HYDROCHLORIDE 100 MCG: 10 INJECTION INTRAVENOUS at 08:03

## 2019-03-29 RX ADMIN — FENTANYL CITRATE 50 MCG: 50 INJECTION, SOLUTION INTRAMUSCULAR; INTRAVENOUS at 07:03

## 2019-03-29 RX ADMIN — PHENYLEPHRINE HYDROCHLORIDE 100 MCG: 10 INJECTION INTRAVENOUS at 07:03

## 2019-03-29 RX ADMIN — EPHEDRINE SULFATE 25 MG: 50 INJECTION, SOLUTION INTRAMUSCULAR; INTRAVENOUS; SUBCUTANEOUS at 08:03

## 2019-03-29 RX ADMIN — GLYCOPYRROLATE 0.2 MG: 0.2 INJECTION, SOLUTION INTRAMUSCULAR; INTRAVENOUS at 08:03

## 2019-03-29 RX ADMIN — SODIUM CHLORIDE, SODIUM LACTATE, POTASSIUM CHLORIDE, AND CALCIUM CHLORIDE: .6; .31; .03; .02 INJECTION, SOLUTION INTRAVENOUS at 07:03

## 2019-03-29 NOTE — BRIEF OP NOTE
Patient: Remy Edmond     Date of Procedure: 3/29/2019    Procedure: Right mastectomy  [place] node injection  Excision of deep right axillary sentinel node    Surgeon: Porter Valencia MD    Assistant: Sherita Horner    Pre-op Diagnosis: Malignant neoplasm of right female breast, unspecified estrogen receptor status, unspecified site of breast [C50.911]     Post-op Diagnosis: Malignant neoplasm of right female breast, unspecified estrogen receptor status, unspecified site of breast [C50.911]    Findings: breast tissue  Negative sentinel node    Specimen: right breast  Right axillary sentinel node    EBL: 30 cc    Complications: None

## 2019-03-29 NOTE — ANESTHESIA PREPROCEDURE EVALUATION
03/29/2019  Remy Edmond is a 83 y.o., female.    Anesthesia Evaluation    I have reviewed the Patient Summary Reports.    I have reviewed the Nursing Notes.      Review of Systems  Cardiovascular:   Hypertension    Hepatic/GI:   GERD    Endocrine:   Hypothyroidism    Psych:   Psychiatric History          Physical Exam  General:  Well nourished    Airway/Jaw/Neck:  Airway Findings: Mouth Opening: Normal Tongue: Normal  Mallampati: II  Improves to I with phonation.  TM Distance: Normal, at least 6 cm  Jaw/Neck Findings:  Neck ROM: Normal ROM     Eyes/Ears/Nose:  Eyes/Ears/Nose Findings:    Dental:  Dental Findings: In tact   Chest/Lungs:  Chest/Lungs Findings: Normal Respiratory Rate     Heart/Vascular:  Heart Findings: Rate: Normal  Rhythm: Regular Rhythm        Mental Status:  Mental Status Findings:  Cooperative, Alert and Oriented         Anesthesia Plan  Type of Anesthesia, risks & benefits discussed:  Anesthesia Type:  general  Patient's Preference: General  Intra-op Monitoring Plan: standard ASA monitors  Intra-op Monitoring Plan Comments:   Post Op Pain Control Plan:   Post Op Pain Control Plan Comments:   Induction:   IV  Beta Blocker:  Patient is not currently on a Beta-Blocker (No further documentation required).       Informed Consent: Patient understands risks and agrees with Anesthesia plan.  Questions answered. Anesthesia consent signed with patient.  ASA Score: 2     Day of Surgery Review of History & Physical:    H&P update referred to the surgeon.         Ready For Surgery From Anesthesia Perspective.

## 2019-03-29 NOTE — INTERVAL H&P NOTE
The patient has been examined and the H&P has been reviewed:    I concur with the findings and no changes have occurred since H&P was written.    Anesthesia/Surgery risks, benefits and alternative options discussed and understood by patient/family.          Active Hospital Problems    Diagnosis  POA    Breast cancer, right breast [C50.911]  Yes      Resolved Hospital Problems   No resolved problems to display.

## 2019-03-29 NOTE — PLAN OF CARE
Report to Uzma. Patient in no distress. Pain level 0, no nausea present. VS stable, SOFI drains with minimal blood loss. Dressing to right breast dry intact no drainage. AAOx3. Son at [facility].

## 2019-03-29 NOTE — TRANSFER OF CARE
"Anesthesia Transfer of Care Note    Patient: Remy Edmond    Procedure(s) Performed: Procedure(s) (LRB):  MASTECTOMY (Right)  BIOPSY, LYMPH NODE, SENTINEL (Right)    Patient location: PACU    Anesthesia Type: general    Transport from OR: Transported from OR on 2-3 L/min O2 by NC with adequate spontaneous ventilation    Post pain: adequate analgesia    Post assessment: no apparent anesthetic complications and tolerated procedure well    Post vital signs: stable    Level of consciousness: awake, alert and oriented    Nausea/Vomiting: no nausea/vomiting    Complications: none    Transfer of care protocol was followed      Last vitals:   Visit Vitals  BP (!) 175/74 (BP Location: Left arm, Patient Position: Lying)   Pulse 70   Temp 36.6 °C (97.9 °F) (Skin)   Resp 18   Ht 5' 8" (1.727 m)   Wt 67.6 kg (149 lb)   SpO2 97%   BMI 22.66 kg/m²     "

## 2019-03-29 NOTE — ANESTHESIA POSTPROCEDURE EVALUATION
Anesthesia Post Evaluation    Patient: Remy Edmond    Procedure(s) Performed: Procedure(s) (LRB):  MASTECTOMY (Right)  BIOPSY, LYMPH NODE, SENTINEL (Right)    Final Anesthesia Type: general  Patient location during evaluation: PACU  Patient participation: Yes- Able to Participate  Level of consciousness: awake and alert  Post-procedure vital signs: reviewed and stable  Pain management: adequate  Airway patency: patent  PONV status at discharge: No PONV  Anesthetic complications: no      Cardiovascular status: blood pressure returned to baseline and hemodynamically stable  Respiratory status: unassisted  Hydration status: euvolemic  Follow-up not needed.          Vitals Value Taken Time   /57 3/29/2019  9:13 AM   Temp 36.6 °C (97.9 °F) 3/29/2019  6:44 AM   Pulse 74 3/29/2019  9:14 AM   Resp 18 3/29/2019  6:44 AM   SpO2 99 % 3/29/2019  9:14 AM   Vitals shown include unvalidated device data.      No case tracking events are documented in the log.      Pain/Rocio Score: No data recorded

## 2019-03-29 NOTE — PLAN OF CARE
03/29/19 1139   Patient Assessment/Suction   Level of Consciousness (AVPU) alert   PRE-TX-O2   O2 Device (Oxygen Therapy) nasal cannula   $ Is the patient on Low Flow Oxygen? Yes   Flow (L/min) 2   SpO2 97 %   Pulse Oximetry Type Intermittent   $ Pulse Oximetry - Multiple Charge Pulse Oximetry - Multiple   Pulse 85   Resp 18   Incentive Spirometer   $ Incentive Spirometer Charges done with encouragement   Incentive Spirometer Predicted Level (mL) 2300   Administration (IS) instruction provided, initial   Number of Repetitions (IS) 10   Level Incentive Spirometer (mL) 750   Patient Tolerance (IS) good       Patient encouraged to do deep breathing exercises independently.

## 2019-03-30 VITALS
WEIGHT: 149 LBS | HEIGHT: 68 IN | OXYGEN SATURATION: 98 % | TEMPERATURE: 98 F | HEART RATE: 71 BPM | RESPIRATION RATE: 16 BRPM | SYSTOLIC BLOOD PRESSURE: 121 MMHG | BODY MASS INDEX: 22.58 KG/M2 | DIASTOLIC BLOOD PRESSURE: 66 MMHG

## 2019-03-30 PROCEDURE — 63600175 PHARM REV CODE 636 W HCPCS: Performed by: SURGERY

## 2019-03-30 PROCEDURE — C9113 INJ PANTOPRAZOLE SODIUM, VIA: HCPCS | Performed by: SURGERY

## 2019-03-30 PROCEDURE — 94761 N-INVAS EAR/PLS OXIMETRY MLT: CPT

## 2019-03-30 PROCEDURE — 94799 UNLISTED PULMONARY SVC/PX: CPT

## 2019-03-30 PROCEDURE — 25000003 PHARM REV CODE 250: Performed by: SURGERY

## 2019-03-30 RX ADMIN — ATENOLOL 50 MG: 25 TABLET ORAL at 08:03

## 2019-03-30 RX ADMIN — GABAPENTIN 100 MG: 100 CAPSULE ORAL at 08:03

## 2019-03-30 RX ADMIN — PANTOPRAZOLE SODIUM 40 MG: 40 INJECTION, POWDER, LYOPHILIZED, FOR SOLUTION INTRAVENOUS at 05:03

## 2019-03-30 RX ADMIN — HYDROCODONE BITARTRATE AND ACETAMINOPHEN 1 TABLET: 5; 325 TABLET ORAL at 08:03

## 2019-03-30 NOTE — NURSING
Dr. Ma contacted floor this AM. Pt approved for discharge home. Rx sent to patients pharmacy. New orders given to remove IV

## 2019-03-30 NOTE — PROGRESS NOTES
03/30/19 0748   Patient Assessment/Suction   Level of Consciousness (AVPU) alert   PRE-TX-O2   O2 Device (Oxygen Therapy) room air   SpO2 98 %   Pulse Oximetry Type Intermittent   $ Pulse Oximetry - Multiple Charge Pulse Oximetry - Multiple   Incentive Spirometer   $ Incentive Spirometer Charges done with encouragement   Administration (IS) mouthpiece   Number of Repetitions (IS) 10   Level Incentive Spirometer (mL) 1250   Patient Tolerance (IS) good

## 2019-03-30 NOTE — PLAN OF CARE
"Problem: Adult Inpatient Plan of Care  Goal: Plan of Care Review  Outcome: Outcome(s) achieved Date Met: 03/30/19  Pt states "understanding," to d/c instructions, follow up visits and new medication administration,  IV removed, pt tolerated well, pt packed personal belongings per self, pt c/o intermittent stabbing pain, PRN given prior to d/c, see mar's,  escorted to main lobby by staff, to home per private vehicle, safety maintain          "

## 2019-03-30 NOTE — PLAN OF CARE
03/30/19 0929   Final Note   Assessment Type Final Discharge Note   Anticipated Discharge Disposition Home

## 2019-03-30 NOTE — PLAN OF CARE
Problem: Adult Inpatient Plan of Care  Goal: Plan of Care Review  Outcome: Ongoing (interventions implemented as appropriate)  Plan of care reviewed with patient. Patient verbalized understanding. AAO x 4. VSS/NADN. IV fluids infusing per MD orders. PRN pain meds given with relief noted. Patient ambulatory to bathroom with stand by assist only. SR up x 2, bed in lowest position, call light in reach. Will continue to monitor.

## 2019-03-30 NOTE — PLAN OF CARE
SW met w/ pt and son prior to d/c for GARCIA & Assessment.  Pt lives alone but her son, Rei, will be staying with her for next 2 weeks during recovery.  Pt denies HH or use of DME, reports independence w/ ADL.  Pt has Advanced Directives not on file w/ medical record.  Son, Rei, has medical POA.       03/30/19 0926   Discharge Assessment   Assessment Type Discharge Planning Assessment   Confirmed/corrected address and phone number on facesheet? Yes   Assessment information obtained from? Patient   Communicated expected length of stay with patient/caregiver yes   Prior to hospitilization cognitive status: Alert/Oriented   Prior to hospitalization functional status: Independent   Current cognitive status: Alert/Oriented   Current Functional Status: Independent   Facility Arrived From: home   Lives With alone   Able to Return to Prior Arrangements yes   Is patient able to care for self after discharge? Yes   Who are your caregiver(s) and their phone number(s)? son/JASSI Minaya Cavignac  [phone]   Patient's perception of discharge disposition home or selfcare   Readmission Within the Last 30 Days no previous admission in last 30 days   Patient currently being followed by outpatient case management? No   Patient currently receives any other outside agency services? No   Equipment Currently Used at Home none   Do you have any problems affording any of your prescribed medications? No   Is the patient taking medications as prescribed? yes   Does the patient have transportation home? Yes   Transportation Anticipated family or friend will provide   Does the patient receive services at the [facility]? No   Discharge Plan A Home with family   Discharge Plan B Home with family   DME Needed Upon Discharge  none   Patient/Family in Agreement with Plan yes

## 2019-04-01 NOTE — DISCHARGE SUMMARY
Discharge Summary  General Surgery      Admit Date: 3/29/2019    Discharge Date :3/29/2019    Attending Physician: Porter Ma     Discharge Physician: Porter Ma    Discharged Condition: good    Discharge Diagnosis: Malignant neoplasm of right female breast, unspecified estrogen receptor status, unspecified site of breast [C50.911]    Treatments/Procedures: Procedure(s) (LRB):  MASTECTOMY (Right)  BIOPSY, LYMPH NODE, SENTINEL (Right)    Hospital Course: Uneventful; Discharged home.    Significant Diagnostic Studies: none    Disposition: Home or Self Care    Diet: Regular    Follow up: Office 10-14 days    Activity: No heavy lifting till seen in office.    Patient Instructions:       No discharge procedures on file.

## 2019-04-10 ENCOUNTER — OFFICE VISIT (OUTPATIENT)
Dept: SURGERY | Facility: CLINIC | Age: 84
End: 2019-04-10
Payer: MEDICARE

## 2019-04-10 VITALS — WEIGHT: 151.69 LBS | BODY MASS INDEX: 23.06 KG/M2 | TEMPERATURE: 98 F

## 2019-04-10 DIAGNOSIS — Z98.890 POST-OPERATIVE STATE: Primary | ICD-10-CM

## 2019-04-10 PROCEDURE — 99212 OFFICE O/P EST SF 10 MIN: CPT | Mod: PBBFAC,PO | Performed by: SURGERY

## 2019-04-10 PROCEDURE — 99999 PR PBB SHADOW E&M-EST. PATIENT-LVL II: ICD-10-PCS | Mod: PBBFAC,,, | Performed by: SURGERY

## 2019-04-10 PROCEDURE — 99024 POSTOP FOLLOW-UP VISIT: CPT | Mod: POP,,, | Performed by: SURGERY

## 2019-04-10 PROCEDURE — 99024 PR POST-OP FOLLOW-UP VISIT: ICD-10-PCS | Mod: POP,,, | Performed by: SURGERY

## 2019-04-10 PROCEDURE — 99999 PR PBB SHADOW E&M-EST. PATIENT-LVL II: CPT | Mod: PBBFAC,,, | Performed by: SURGERY

## 2019-04-12 NOTE — PROGRESS NOTES
POST-OP NOTE    PROCEDURE: Right mastectomy    COMPLAINTS: None.    EXAM: Incision well approximated. No drainage. No infection.      IMPRESSION: Path pending.    PLAN: FU as needed.  One drain removed.

## 2019-04-17 ENCOUNTER — OFFICE VISIT (OUTPATIENT)
Dept: SURGERY | Facility: CLINIC | Age: 84
End: 2019-04-17
Payer: MEDICARE

## 2019-04-17 VITALS
WEIGHT: 149.94 LBS | SYSTOLIC BLOOD PRESSURE: 167 MMHG | RESPIRATION RATE: 16 BRPM | HEART RATE: 76 BPM | DIASTOLIC BLOOD PRESSURE: 75 MMHG | BODY MASS INDEX: 22.79 KG/M2

## 2019-04-17 DIAGNOSIS — C50.911 MALIGNANT NEOPLASM OF RIGHT FEMALE BREAST, UNSPECIFIED ESTROGEN RECEPTOR STATUS, UNSPECIFIED SITE OF BREAST: Primary | ICD-10-CM

## 2019-04-17 PROCEDURE — 99024 POSTOP FOLLOW-UP VISIT: CPT | Mod: POP,,, | Performed by: SURGERY

## 2019-04-17 PROCEDURE — 99024 PR POST-OP FOLLOW-UP VISIT: ICD-10-PCS | Mod: POP,,, | Performed by: SURGERY

## 2019-04-17 PROCEDURE — 99213 OFFICE O/P EST LOW 20 MIN: CPT | Mod: PBBFAC,PO | Performed by: SURGERY

## 2019-04-17 PROCEDURE — 99999 PR PBB SHADOW E&M-EST. PATIENT-LVL III: ICD-10-PCS | Mod: PBBFAC,,, | Performed by: SURGERY

## 2019-04-17 PROCEDURE — 99999 PR PBB SHADOW E&M-EST. PATIENT-LVL III: CPT | Mod: PBBFAC,,, | Performed by: SURGERY

## 2019-04-29 NOTE — PROGRESS NOTES
POST-OP NOTE    PROCEDURE: Right mastectomy with axillary dissection    COMPLAINTS: None.    EXAM: Incision well approximated. No drainage. No infection.      IMPRESSION:FINAL PATHOLOGIC DIAGNOSIS  1. RIGHT AXILLARY SENTINEL LYMPH NODES (5):  - 5 out of 5 lymph nodes are negative for malignancy by routine and immunohistochemistry staining.  - Mild reactive lymphoid hyperplasia, mixed pattern.  2. RIGHT BREAST TO INCLUDE SEGMENT OF SKIN WITH NIPPLE, SIMPLE MASTECTOMY:  - Multifocal invasive well-differentiated ductal carcinoma, [place] histologic grade 1. The surgical margins,  nipple and skin are free of invasive carcinoma.  - Low to intermediate-grade ductal carcinoma in situ with focal lobular extension. The DCIS focally involves nipple  ducts. All surgical margins are free of DCIS.  - Atypical ductal hyperplasia.  - Extensive flat epithelial atypia.  - Intraductal papilloma with focal low-grade ductal carcinoma in situ.  - Fibrocystic changes with extensive columnar cell change, columnar cell hyperplasia, sclerosing adenosis, blunt  duct adenosis and focal microcalcifications.  - Focal marked periductal fibrosis with stromal calcification.  - Seborrheic keratosis (1) and actinic keratosis (1) of skin.  - Organizing biopsy sites (3) with fibrosis, fat necrosis, chronic inflammation, hemosiderin deposition and foreign  body giant cell reaction.  INTRAMAMMARY LYMPH NODE (1):  - No evidence of malignancy.  - Mild reactive lymphoid hyperplasia.    PLAN: FU as needed.  Refer to Oncology.

## 2019-05-06 ENCOUNTER — TELEPHONE (OUTPATIENT)
Dept: HEMATOLOGY/ONCOLOGY | Facility: CLINIC | Age: 84
End: 2019-05-06

## 2019-05-06 NOTE — TELEPHONE ENCOUNTER
Pt's son called.  They can't make appt on 5/9.  Rescheduled to 5/14.  Pt's son verbalized understanding.

## 2019-05-13 ENCOUNTER — TELEPHONE (OUTPATIENT)
Dept: HEMATOLOGY/ONCOLOGY | Facility: CLINIC | Age: 84
End: 2019-05-13

## 2019-05-13 NOTE — TELEPHONE ENCOUNTER
Returned call to patient, patient cancelled 5/14/19 consultation with Dr. Guerin stating that her son is out of town working and she will call back to reschedule when her son is available.    Dr. Ma informed via this message

## 2019-05-13 NOTE — TELEPHONE ENCOUNTER
----- Message from Misa Schumacher sent at 5/13/2019 10:06 AM CDT -----  Contact: self  Pt is calling in regards to rescheduling her appt on tomorrow.     Pt would like a call back to do so at [phone].    Thank you

## 2019-05-15 ENCOUNTER — TELEPHONE (OUTPATIENT)
Dept: HEMATOLOGY/ONCOLOGY | Facility: CLINIC | Age: 84
End: 2019-05-15

## 2019-05-21 ENCOUNTER — INITIAL CONSULT (OUTPATIENT)
Dept: HEMATOLOGY/ONCOLOGY | Facility: CLINIC | Age: 84
End: 2019-05-21
Payer: MEDICARE

## 2019-05-21 VITALS
HEART RATE: 78 BPM | SYSTOLIC BLOOD PRESSURE: 141 MMHG | HEIGHT: 66 IN | RESPIRATION RATE: 18 BRPM | WEIGHT: 151.69 LBS | DIASTOLIC BLOOD PRESSURE: 82 MMHG | BODY MASS INDEX: 24.38 KG/M2 | TEMPERATURE: 98 F

## 2019-05-21 DIAGNOSIS — Z17.0 MALIGNANT NEOPLASM OF NIPPLE OF RIGHT BREAST IN FEMALE, ESTROGEN RECEPTOR POSITIVE: ICD-10-CM

## 2019-05-21 DIAGNOSIS — C50.011 MALIGNANT NEOPLASM OF NIPPLE OF RIGHT BREAST IN FEMALE, ESTROGEN RECEPTOR POSITIVE: ICD-10-CM

## 2019-05-21 DIAGNOSIS — D72.829 LEUKOCYTOSIS, UNSPECIFIED TYPE: Primary | ICD-10-CM

## 2019-05-21 PROCEDURE — 99205 PR OFFICE/OUTPT VISIT, NEW, LEVL V, 60-74 MIN: ICD-10-PCS | Mod: S$PBB,,, | Performed by: INTERNAL MEDICINE

## 2019-05-21 PROCEDURE — 99999 PR PBB SHADOW E&M-EST. PATIENT-LVL III: CPT | Mod: PBBFAC,,, | Performed by: INTERNAL MEDICINE

## 2019-05-21 PROCEDURE — 99213 OFFICE O/P EST LOW 20 MIN: CPT | Mod: PBBFAC,PN | Performed by: INTERNAL MEDICINE

## 2019-05-21 PROCEDURE — 99999 PR PBB SHADOW E&M-EST. PATIENT-LVL III: ICD-10-PCS | Mod: PBBFAC,,, | Performed by: INTERNAL MEDICINE

## 2019-05-21 PROCEDURE — 99205 OFFICE O/P NEW HI 60 MIN: CPT | Mod: S$PBB,,, | Performed by: INTERNAL MEDICINE

## 2019-05-21 RX ORDER — ANASTROZOLE 1 MG/1
1 TABLET ORAL DAILY
Qty: 360 TABLET | Refills: 0 | Status: SHIPPED | OUTPATIENT
Start: 2019-05-21 | End: 2020-03-13 | Stop reason: SDUPTHER

## 2019-05-21 NOTE — PROGRESS NOTES
HPI  83 years old  female referred to [facility] for breast cancer.  Patient has been seen and treated by Dr. Ma for right simple mastectomy.  Patient has breast cancer invasive ductal carcinoma.  Pathology on 03/29/2019 shows pT1c N0 MX tumor 1.1 cm.  Tumor is ERPR positive HER2 negative.  Negative margin and negative lymphovascular invasion and 5/5 lymph node negative for malignancy.    Patient doing well no acute complaints.    Patient is accompanied by her son during today's visit.  Patient and patient is a express the wishes that do not want to receive chemotherapy.      Fourteen point review of system negative.        Past Medical History:   Diagnosis Date    Anxiety     Arthritis     GERD (gastroesophageal reflux disease)     Hyperlipidemia     Hypertension     Irritable bladder     Osteopenia 9/12/2016    Plantar fascial fibromatosis     PONV (postoperative nausea and vomiting)     [facility]    Thyroid disease     Vitamin deficiency      Social History     Socioeconomic History    Marital status: Unknown     Spouse name: Not on file    Number of children: Not on file    Years of education: Not on file    Highest education level: Not on file   Occupational History    Not on file   Social Needs    Financial resource strain: Not on file    Food insecurity:     Worry: Not on file     Inability: Not on file    Transportation needs:     Medical: Not on file     Non-medical: Not on file   Tobacco Use    Smoking status: Never Smoker    Smokeless tobacco: Never Used   Substance and Sexual Activity    Alcohol use: No    Drug use: No    Sexual activity: Not on file   Lifestyle    Physical activity:     Days per week: Not on file     Minutes per session: Not on file    Stress: Not on file   Relationships    Social connections:     Talks on phone: Not on file     Gets together: Not on file     Attends  Church service: Not on file     Active member of club or organization: Not on file     Attends meetings of clubs or organizations: Not on file     Relationship status: Not on file   Other Topics Concern    Not on file   Social History Narrative    Not on file         Subjective      Review of Systems   Constitutional: Negative for appetite change, fatigue and unexpected weight change.   HENT: Negative for mouth sores.   Eyes: Negative for visual disturbance.   Respiratory: Negative for cough and shortness of breath.   Cardiovascular: Negative for chest pain.   Gastrointestinal: Negative for diarrhea.   Genitourinary: Negative for frequency.   Musculoskeletal: Negative for back pain.   Skin: Negative for rash.   Neurological: Negative for headaches.   Hematological: Negative for adenopathy.   Psychiatric/Behavioral: The patient is not nervous/anxious.   All other systems reviewed and are negative.     Objective    Physical Exam   Vitals:    05/21/19 1425   BP: (!) 141/82   Pulse: 78   Resp: 18   Temp: 98.1 °F (36.7 °C)       Constitutional: patient is oriented to person, place, and time. patient appears well-developed and well-nourished. No distress.   HENT:  Normocephalic atraumatic pupils groin regard to light extraocular muscles intact  Cardiovascular:  Regular rate and rhythm   No edema, no tenderness in the extremities.   Pulmonary/Chest:  Clear to auscultate  Abdominal:  Soft nontender nondistended bowel sounds x4  Musculoskeletal: Normal range of motion.   Gait is normal   No clubbing, cyanosis     Lymphadenopathy:   No evidence of lymphadenopathy  Neurological:  Nonfocal  Skin:  No rash no lesions  Psychiatric:  Pleasant normal affect    CMP  Sodium   Date Value Ref Range Status   03/18/2019 144 136 - 145 mmol/L Final   06/16/2015 146 (H) 137 - 145 MMOL/L Final     Potassium   Date Value Ref Range Status   03/18/2019 4.2 3.5 - 5.1 mmol/L Final   06/16/2015 4.3 3.5 - 5.1 MMOL/L Final     Chloride   Date  Value Ref Range Status   03/18/2019 106 95 - 110 mmol/L Final   06/16/2015 106 98 - 107 MMOL/L Final     CO2   Date Value Ref Range Status   03/18/2019 31 (H) 23 - 29 mmol/L Final     Glucose   Date Value Ref Range Status   03/18/2019 101 70 - 110 mg/dL Final     BUN, Bld   Date Value Ref Range Status   03/18/2019 22 8 - 23 mg/dL Final     Creatinine   Date Value Ref Range Status   03/18/2019 1.1 0.5 - 1.4 mg/dL Final   06/16/2015 1.00 0.52 - 1.04 MG/DL Final     Calcium   Date Value Ref Range Status   03/18/2019 9.3 8.7 - 10.5 mg/dL Final     Total Protein   Date Value Ref Range Status   03/18/2019 7.2 6.0 - 8.4 g/dL Final     Albumin   Date Value Ref Range Status   03/18/2019 3.8 3.5 - 5.2 g/dL Final   06/16/2015 4.0 3.5 - 5.0 G/DL Final     Total Bilirubin   Date Value Ref Range Status   03/18/2019 0.5 0.1 - 1.0 mg/dL Final     Comment:     For infants and newborns, interpretation of results should be based  on gestational age, weight and in agreement with clinical  observations.  Premature Infant recommended reference ranges:  Up to 24 hours.............<8.0 mg/dL  Up to 48 hours............<12.0 mg/dL  3-5 days..................<15.0 mg/dL  6-29 days.................<15.0 mg/dL       Alkaline Phosphatase   Date Value Ref Range Status   03/18/2019 82 55 - 135 U/L Final     AST ([place])   Date Value Ref Range Status   03/30/2016 36 14 - 36 U/L Final     AST   Date Value Ref Range Status   03/18/2019 33 10 - 40 U/L Final     ALT   Date Value Ref Range Status   03/18/2019 24 10 - 44 U/L Final     Anion Gap   Date Value Ref Range Status   03/18/2019 7 (L) 8 - 16 mmol/L Final     eGFR if    Date Value Ref Range Status   03/18/2019 54.4 (A) >60 mL/min/1.73 m^2 Final     eGFR if non    Date Value Ref Range Status   03/18/2019 47.2 (A) >60 mL/min/1.73 m^2 Final     Comment:     Calculation used to obtain the estimated glomerular filtration  rate (eGFR) is the CKD-EPI equation.         Lab Results   Component Value Date    WBC 5.93 03/18/2019    HGB 13.5 03/18/2019    HCT 41.2 03/18/2019     (H) 03/18/2019     03/18/2019       Bone density August 2018 osteopenia      Assessment Plan    [] [id number] ERPR positive HER2 negative right breast cancer invasive ductal carcinoma.  Status post simple mastectomy with lymph node dissection.  Negative lymphovascular invasion, negative margin, lymph node 5/5 negative.  Spoke to patient about adjuvant therapy which including hormone therapy versus chemotherapy.  At this moment patient favors hormone therapy.  I will initiate Arimidex daily.  Patient will need to repeat bone density August 2019.  At the same time I instructed patient start taking calcium vitamin daily as well. Should patient is interested in chemotherapy I would advised patient to perform Oncotype study in [place] of chemotherapy.  However given the age performance status and other comorbidities I would not favor chemotherapy direction    [] Patient will return to me in about 1 week with medical decision I had a feeling that they will moving forward with hormone therapy alone.

## 2019-05-21 NOTE — LETTER
May 21, 2019      Porter Ma MD  [address]  [address]  [facility] [address]           [facility] [address]  [facility] [address]  Phone: [phone]  Fax: [phone]          Patient: Remy Edmond   MR Number: [id number]   YOB: 1935   Date of Visit: 5/21/2019       Dear Dr. Porter Ma:    Thank you for referring Remy Edmond to me for evaluation. Attached you will find relevant portions of my assessment and plan of care.    If you have questions, please do not hesitate to call me. I look forward to following Remy Edmond along with you.    Sincerely,    Ernesto Guerin MD    Enclosure  CC:  No Recipients    If you would like to receive this communication electronically, please contact [email or web address] or [phone] to request more information on [facility] access.    For providers and/or their staff who would like to refer a patient to [facility], please contact us through our one-stop-shop provider referral line, [facility], at [phone].    If you feel you have received this communication in error or would no longer like to receive these types of communications, please e-mail [email or web address]

## 2019-06-03 ENCOUNTER — OFFICE VISIT (OUTPATIENT)
Dept: HEMATOLOGY/ONCOLOGY | Facility: CLINIC | Age: 84
End: 2019-06-03
Payer: MEDICARE

## 2019-06-03 VITALS
HEIGHT: 66 IN | TEMPERATURE: 98 F | WEIGHT: 152.31 LBS | SYSTOLIC BLOOD PRESSURE: 139 MMHG | RESPIRATION RATE: 18 BRPM | DIASTOLIC BLOOD PRESSURE: 73 MMHG | BODY MASS INDEX: 24.48 KG/M2 | HEART RATE: 71 BPM

## 2019-06-03 DIAGNOSIS — C50.011 MALIGNANT NEOPLASM OF NIPPLE OF RIGHT BREAST IN FEMALE, ESTROGEN RECEPTOR POSITIVE: Primary | ICD-10-CM

## 2019-06-03 DIAGNOSIS — Z17.0 MALIGNANT NEOPLASM OF NIPPLE OF RIGHT BREAST IN FEMALE, ESTROGEN RECEPTOR POSITIVE: Primary | ICD-10-CM

## 2019-06-03 DIAGNOSIS — M81.0 OSTEOPOROSIS, UNSPECIFIED OSTEOPOROSIS TYPE, UNSPECIFIED PATHOLOGICAL FRACTURE PRESENCE: ICD-10-CM

## 2019-06-03 DIAGNOSIS — M85.80 OSTEOPENIA, UNSPECIFIED LOCATION: ICD-10-CM

## 2019-06-03 PROCEDURE — 99215 PR OFFICE/OUTPT VISIT, EST, LEVL V, 40-54 MIN: ICD-10-PCS | Mod: S$PBB,,, | Performed by: INTERNAL MEDICINE

## 2019-06-03 PROCEDURE — 99999 PR PBB SHADOW E&M-EST. PATIENT-LVL III: ICD-10-PCS | Mod: PBBFAC,,, | Performed by: INTERNAL MEDICINE

## 2019-06-03 PROCEDURE — 99215 OFFICE O/P EST HI 40 MIN: CPT | Mod: S$PBB,,, | Performed by: INTERNAL MEDICINE

## 2019-06-03 PROCEDURE — 99999 PR PBB SHADOW E&M-EST. PATIENT-LVL III: CPT | Mod: PBBFAC,,, | Performed by: INTERNAL MEDICINE

## 2019-06-03 PROCEDURE — 99213 OFFICE O/P EST LOW 20 MIN: CPT | Mod: PBBFAC,PN | Performed by: INTERNAL MEDICINE

## 2019-06-03 RX ORDER — ANASTROZOLE 1 MG/1
1 TABLET ORAL DAILY
Qty: 90 TABLET | Refills: 3 | Status: SHIPPED | OUTPATIENT
Start: 2019-06-03 | End: 2019-09-06 | Stop reason: SDUPTHER

## 2019-06-03 NOTE — PROGRESS NOTES
HPI  83 years old  female referred to [facility] Clinic for breast cancer.  Patient has been seen and treated by Dr. Ma for right simple mastectomy.  Patient has breast cancer invasive ductal carcinoma.  Pathology on 03/29/2019 shows pT1c N0 MX tumor 1.1 cm.  Tumor is ERPR positive HER2 negative.  Negative margin and negative lymphovascular invasion and 5/5 lymph node negative for malignancy.    06/03/2019: Patient doing well no acute complaints.    Previously patient expressed wishes not to receive any chemotherapy.  Fourteen point review of system negative.        Past Medical History:   Diagnosis Date    Anxiety     Arthritis     GERD (gastroesophageal reflux disease)     Hyperlipidemia     Hypertension     Irritable bladder     Osteopenia 9/12/2016    Plantar fascial fibromatosis     PONV (postoperative nausea and vomiting)     [facility]    Thyroid disease     Vitamin deficiency      Social History     Socioeconomic History    Marital status: Unknown     Spouse name: Not on file    Number of children: Not on file    Years of education: Not on file    Highest education level: Not on file   Occupational History    Not on file   Social Needs    Financial resource strain: Not on file    Food insecurity:     Worry: Not on file     Inability: Not on file    Transportation needs:     Medical: Not on file     Non-medical: Not on file   Tobacco Use    Smoking status: Never Smoker    Smokeless tobacco: Never Used   Substance and Sexual Activity    Alcohol use: No    Drug use: No    Sexual activity: Not on file   Lifestyle    Physical activity:     Days per week: Not on file     Minutes per session: Not on file    Stress: Not on file   Relationships    Social connections:     Talks on phone: Not on file     Gets together: Not on file     Attends Holiness service: Not on file     Active member of club or organization:  Not on file     Attends meetings of clubs or organizations: Not on file     Relationship status: Not on file   Other Topics Concern    Not on file   Social History Narrative    Not on file         Subjective      Review of Systems   Constitutional: Negative for appetite change, fatigue and unexpected weight change.   HENT: Negative for mouth sores.   Eyes: Negative for visual disturbance.   Respiratory: Negative for cough and shortness of breath.   Cardiovascular: Negative for chest pain.   Gastrointestinal: Negative for diarrhea.   Genitourinary: Negative for frequency.   Musculoskeletal: Negative for back pain.   Skin: Negative for rash.   Neurological: Negative for headaches.   Hematological: Negative for adenopathy.   Psychiatric/Behavioral: The patient is not nervous/anxious.   All other systems reviewed and are negative.     Objective    Physical Exam     Vitals:    06/03/19 1020   BP: 139/73   Pulse: 71   Resp: 18   Temp: 97.8 °F (36.6 °C)        Constitutional:  Alert oriented x3 no acute distress  HENT:  Normocephalic atraumatic pupils groin regard to light extraocular muscles intact  Cardiovascular:  Regular rate and rhythm   No edema, no tenderness in the extremities.   Pulmonary/Chest:  Clear to auscultate  Abdominal:  Soft nontender nondistended bowel sounds x4  Musculoskeletal: Normal range of motion. Gait is normal. No clubbing, cyanosis     Lymphadenopathy:  No evidence of lymphadenopathy  Neurological:  Nonfocal.  Cranial nerves 2-12 grossly intact sensorimotor grossly intact.  Skin:  No rash no lesions  Psychiatric:  Pleasant normal affect    CMP  Sodium   Date Value Ref Range Status   03/18/2019 144 136 - 145 mmol/L Final   06/16/2015 146 (H) 137 - 145 MMOL/L Final     Potassium   Date Value Ref Range Status   03/18/2019 4.2 3.5 - 5.1 mmol/L Final   06/16/2015 4.3 3.5 - 5.1 MMOL/L Final     Chloride   Date Value Ref Range Status   03/18/2019 106 95 - 110 mmol/L Final   06/16/2015 106 98 - 107  MMOL/L Final     CO2   Date Value Ref Range Status   03/18/2019 31 (H) 23 - 29 mmol/L Final     Glucose   Date Value Ref Range Status   03/18/2019 101 70 - 110 mg/dL Final     BUN, Bld   Date Value Ref Range Status   03/18/2019 22 8 - 23 mg/dL Final     Creatinine   Date Value Ref Range Status   03/18/2019 1.1 0.5 - 1.4 mg/dL Final   06/16/2015 1.00 0.52 - 1.04 MG/DL Final     Calcium   Date Value Ref Range Status   03/18/2019 9.3 8.7 - 10.5 mg/dL Final     Total Protein   Date Value Ref Range Status   03/18/2019 7.2 6.0 - 8.4 g/dL Final     Albumin   Date Value Ref Range Status   03/18/2019 3.8 3.5 - 5.2 g/dL Final   06/16/2015 4.0 3.5 - 5.0 G/DL Final     Total Bilirubin   Date Value Ref Range Status   03/18/2019 0.5 0.1 - 1.0 mg/dL Final     Comment:     For infants and newborns, interpretation of results should be based  on gestational age, weight and in agreement with clinical  observations.  Premature Infant recommended reference ranges:  Up to 24 hours.............<8.0 mg/dL  Up to 48 hours............<12.0 mg/dL  3-5 days..................<15.0 mg/dL  6-29 days.................<15.0 mg/dL       Alkaline Phosphatase   Date Value Ref Range Status   03/18/2019 82 55 - 135 U/L Final     AST ([place])   Date Value Ref Range Status   03/30/2016 36 14 - 36 U/L Final     AST   Date Value Ref Range Status   03/18/2019 33 10 - 40 U/L Final     ALT   Date Value Ref Range Status   03/18/2019 24 10 - 44 U/L Final     Anion Gap   Date Value Ref Range Status   03/18/2019 7 (L) 8 - 16 mmol/L Final     eGFR if    Date Value Ref Range Status   03/18/2019 54.4 (A) >60 mL/min/1.73 m^2 Final     eGFR if non    Date Value Ref Range Status   03/18/2019 47.2 (A) >60 mL/min/1.73 m^2 Final     Comment:     Calculation used to obtain the estimated glomerular filtration  rate (eGFR) is the CKD-EPI equation.        Lab Results   Component Value Date    WBC 5.93 03/18/2019    HGB 13.5 03/18/2019     HCT 41.2 03/18/2019     (H) 03/18/2019     03/18/2019       Bone density August 2018 osteopenia      Assessment Plan    [] [id number] ERPR positive HER2 negative right breast cancer invasive ductal carcinoma.  Status post simple mastectomy with lymph node dissection.  Negative lymphovascular invasion, negative margin, lymph node 5/5 negative.      Patient declined any additional chemotherapy agreeable to hormone therapy only.  Will start and Arimidex daily.  Bone density will be ordered on this visit.  Patient will return to clinic 1 month to repeat interval blood work     Continue on other routine surveillance protocol    Calcium vitamin daily    M*Modal dictation

## 2019-06-27 ENCOUNTER — TELEPHONE (OUTPATIENT)
Dept: HEMATOLOGY/ONCOLOGY | Facility: CLINIC | Age: 84
End: 2019-06-27

## 2019-06-27 NOTE — TELEPHONE ENCOUNTER
Spoke w pt.  Pt's son brings pt to appts.  Pt's son will be out of town for original appt.  Appts changed per pt's request.  Pt verbalized understanding and appreciation.

## 2019-06-27 NOTE — TELEPHONE ENCOUNTER
----- Message from Mireya Hanson sent at 6/27/2019  9:14 AM CDT -----  Contact: self      Patient need to reschedule upcoming appointments, please call back at [phone] (home)

## 2019-07-05 ENCOUNTER — HOSPITAL ENCOUNTER (OUTPATIENT)
Dept: RADIOLOGY | Facility: HOSPITAL | Age: 84
Discharge: HOME | End: 2019-07-05
Attending: INTERNAL MEDICINE
Payer: MEDICARE

## 2019-07-05 DIAGNOSIS — C50.011 MALIGNANT NEOPLASM OF NIPPLE OF RIGHT BREAST IN FEMALE, ESTROGEN RECEPTOR POSITIVE: ICD-10-CM

## 2019-07-05 DIAGNOSIS — Z17.0 MALIGNANT NEOPLASM OF NIPPLE OF RIGHT BREAST IN FEMALE, ESTROGEN RECEPTOR POSITIVE: ICD-10-CM

## 2019-07-05 DIAGNOSIS — M81.0 OSTEOPOROSIS, UNSPECIFIED OSTEOPOROSIS TYPE, UNSPECIFIED PATHOLOGICAL FRACTURE PRESENCE: ICD-10-CM

## 2019-07-05 PROCEDURE — 77080 DEXA BONE DENSITY SPINE HIP: ICD-10-PCS | Mod: 26,,, | Performed by: RADIOLOGY

## 2019-07-05 PROCEDURE — 77080 DXA BONE DENSITY AXIAL: CPT | Mod: 26,,, | Performed by: RADIOLOGY

## 2019-07-05 PROCEDURE — 77080 DXA BONE DENSITY AXIAL: CPT | Mod: TC,PO

## 2019-07-08 ENCOUNTER — TELEPHONE (OUTPATIENT)
Dept: HEMATOLOGY/ONCOLOGY | Facility: CLINIC | Age: 84
End: 2019-07-08

## 2019-07-08 NOTE — TELEPHONE ENCOUNTER
----- Message from Leonora Briscoe sent at 7/8/2019  3:14 PM CDT -----    Pt is calling to  reschedule   Duong// please  Call  For details

## 2019-08-30 ENCOUNTER — TELEPHONE (OUTPATIENT)
Dept: PODIATRY | Facility: CLINIC | Age: 84
End: 2019-08-30

## 2019-08-30 NOTE — TELEPHONE ENCOUNTER
----- Message from Ashely Palmer sent at 8/30/2019 11:48 AM CDT -----  Contact: carlos Minaya   Patient need to reschedule appt that was 07/10 please       Please call to advice [phone] carlos Minaya to reschedule appt

## 2019-08-30 NOTE — TELEPHONE ENCOUNTER
Spoke with Pt's son Rei to r/s pt's f/u appt with Dr Guerin. Pt scheduled on 9/6/19 @ 2451. Pt's son verbalized understanding.

## 2019-09-04 ENCOUNTER — TELEPHONE (OUTPATIENT)
Dept: HEMATOLOGY/ONCOLOGY | Facility: CLINIC | Age: 84
End: 2019-09-04

## 2019-09-04 NOTE — TELEPHONE ENCOUNTER
Called pt to notify of need for lab draw on 9/6/19 prior to appt.  No answer, LM w office number provided for return call.

## 2019-09-06 ENCOUNTER — OFFICE VISIT (OUTPATIENT)
Dept: HEMATOLOGY/ONCOLOGY | Facility: CLINIC | Age: 84
End: 2019-09-06
Payer: MEDICARE

## 2019-09-06 ENCOUNTER — LAB VISIT (OUTPATIENT)
Dept: LAB | Facility: HOSPITAL | Age: 84
End: 2019-09-06
Attending: INTERNAL MEDICINE
Payer: MEDICARE

## 2019-09-06 VITALS
SYSTOLIC BLOOD PRESSURE: 147 MMHG | WEIGHT: 146.19 LBS | TEMPERATURE: 98 F | RESPIRATION RATE: 16 BRPM | BODY MASS INDEX: 23.49 KG/M2 | DIASTOLIC BLOOD PRESSURE: 67 MMHG | HEART RATE: 77 BPM | OXYGEN SATURATION: 99 % | HEIGHT: 66 IN

## 2019-09-06 DIAGNOSIS — M81.0 OSTEOPOROSIS, UNSPECIFIED OSTEOPOROSIS TYPE, UNSPECIFIED PATHOLOGICAL FRACTURE PRESENCE: ICD-10-CM

## 2019-09-06 DIAGNOSIS — Z09 ONCOLOGY FOLLOW-UP ENCOUNTER: ICD-10-CM

## 2019-09-06 DIAGNOSIS — Z17.0 MALIGNANT NEOPLASM OF NIPPLE OF RIGHT BREAST IN FEMALE, ESTROGEN RECEPTOR POSITIVE: ICD-10-CM

## 2019-09-06 DIAGNOSIS — C50.011 MALIGNANT NEOPLASM OF NIPPLE OF RIGHT BREAST IN FEMALE, ESTROGEN RECEPTOR POSITIVE: Primary | ICD-10-CM

## 2019-09-06 DIAGNOSIS — Z17.0 MALIGNANT NEOPLASM OF NIPPLE OF RIGHT BREAST IN FEMALE, ESTROGEN RECEPTOR POSITIVE: Primary | ICD-10-CM

## 2019-09-06 DIAGNOSIS — C50.011 MALIGNANT NEOPLASM OF NIPPLE OF RIGHT BREAST IN FEMALE, ESTROGEN RECEPTOR POSITIVE: ICD-10-CM

## 2019-09-06 LAB
ALBUMIN SERPL BCP-MCNC: 4.3 G/DL (ref 3.5–5.2)
ALP SERPL-CCNC: 83 U/L (ref 38–145)
ALT SERPL W/O P-5'-P-CCNC: 20 U/L (ref 10–44)
ANION GAP SERPL CALC-SCNC: 7 MMOL/L (ref 8–16)
AST SERPL-CCNC: 27 U/L (ref 14–36)
BILIRUB SERPL-MCNC: 1.1 MG/DL (ref 0.2–1.3)
BUN SERPL-MCNC: 17 MG/DL (ref 7–18)
CALCIUM SERPL-MCNC: 9.4 MG/DL (ref 8.4–10.2)
CHLORIDE SERPL-SCNC: 105 MMOL/L (ref 95–110)
CO2 SERPL-SCNC: 30 MMOL/L (ref 22–31)
CREAT SERPL-MCNC: 1.1 MG/DL (ref 0.5–1.4)
EST. GFR  (AFRICAN AMERICAN): 54 ML/MIN/1.73 M^2
EST. GFR  (NON AFRICAN AMERICAN): 47 ML/MIN/1.73 M^2
GLUCOSE SERPL-MCNC: 100 MG/DL (ref 70–110)
POTASSIUM SERPL-SCNC: 4.2 MMOL/L (ref 3.5–5.1)
PROT SERPL-MCNC: 7.7 G/DL (ref 6–8.4)
SODIUM SERPL-SCNC: 142 MMOL/L (ref 136–145)

## 2019-09-06 PROCEDURE — 99214 PR OFFICE/OUTPT VISIT, EST, LEVL IV, 30-39 MIN: ICD-10-PCS | Mod: S$PBB,,, | Performed by: INTERNAL MEDICINE

## 2019-09-06 PROCEDURE — 99999 PR PBB SHADOW E&M-EST. PATIENT-LVL III: ICD-10-PCS | Mod: PBBFAC,,, | Performed by: INTERNAL MEDICINE

## 2019-09-06 PROCEDURE — 36415 COLL VENOUS BLD VENIPUNCTURE: CPT | Mod: PN

## 2019-09-06 PROCEDURE — 80053 COMPREHEN METABOLIC PANEL: CPT

## 2019-09-06 PROCEDURE — 99999 PR PBB SHADOW E&M-EST. PATIENT-LVL III: CPT | Mod: PBBFAC,,, | Performed by: INTERNAL MEDICINE

## 2019-09-06 PROCEDURE — 99214 OFFICE O/P EST MOD 30 MIN: CPT | Mod: S$PBB,,, | Performed by: INTERNAL MEDICINE

## 2019-09-06 PROCEDURE — 99213 OFFICE O/P EST LOW 20 MIN: CPT | Mod: PBBFAC,PN | Performed by: INTERNAL MEDICINE

## 2019-09-06 PROCEDURE — 80053 COMPREHEN METABOLIC PANEL: CPT | Mod: PN

## 2019-09-06 RX ORDER — ANASTROZOLE 1 MG/1
1 TABLET ORAL DAILY
Qty: 90 TABLET | Refills: 0 | Status: SHIPPED | OUTPATIENT
Start: 2019-09-06 | End: 2020-03-05 | Stop reason: SDUPTHER

## 2019-09-06 NOTE — PROGRESS NOTES
HPI  83 years old  female referred to [facility] Clinic for breast cancer.  Patient has been seen and treated by Dr. Ma for right simple mastectomy.  Patient has breast cancer invasive ductal carcinoma.  Pathology on 03/29/2019 shows pT1c N0 MX tumor 1.1 cm.  Tumor is ERPR positive HER2 negative.  Negative margin and negative lymphovascular invasion and 5/5 lymph node negative for malignancy.    09/06/2019.  Patient here to follow-up no acute events.  Tolerating medication well. Denies chest pain shortness breath.  Denies abdominal pain nausea vomiting diarrhea.  Denies fever chills.  Fourteen point review system negative as above.        Past Medical History:   Diagnosis Date    Anxiety     Arthritis     GERD (gastroesophageal reflux disease)     Hyperlipidemia     Hypertension     Irritable bladder     Osteopenia 9/12/2016    Plantar fascial fibromatosis     PONV (postoperative nausea and vomiting)     [facility]    Thyroid disease     Vitamin deficiency      Social History     Socioeconomic History    Marital status: Unknown     Spouse name: Not on file    Number of children: Not on file    Years of education: Not on file    Highest education level: Not on file   Occupational History    Not on file   Social Needs    Financial resource strain: Not on file    Food insecurity:     Worry: Not on file     Inability: Not on file    Transportation needs:     Medical: Not on file     Non-medical: Not on file   Tobacco Use    Smoking status: Never Smoker    Smokeless tobacco: Never Used   Substance and Sexual Activity    Alcohol use: No    Drug use: No    Sexual activity: Not on file   Lifestyle    Physical activity:     Days per week: Not on file     Minutes per session: Not on file    Stress: Not on file   Relationships    Social connections:     Talks on phone: Not on file     Gets together: Not on file     Attends  [employer] service: Not on file     Active member of club or organization: Not on file     Attends meetings of clubs or organizations: Not on file     Relationship status: Not on file   Other Topics Concern    Not on file   Social History Narrative    Not on file       Objective    Physical Exam     Vitals:    09/06/19 0920   BP: (!) 147/67   Pulse: 77   Resp: 16   Temp: 97.6 °F (36.4 °C)        Constitutional:  Alert oriented x3 no acute distress  HENT:  Normocephalic atraumatic pupils groin regard to light extraocular muscles intact  Cardiovascular:  Regular rate and rhythm   No edema, no tenderness in the extremities.   Pulmonary/Chest:  Clear to auscultate  Abdominal:  Soft nontender nondistended bowel sounds x4  Musculoskeletal: Normal range of motion. Gait is normal. No clubbing, cyanosis     Lymphadenopathy:  No evidence of lymphadenopathy  Neurological:  Nonfocal.  Cranial nerves 2-12 grossly intact sensorimotor grossly intact.  Skin:  No rash no lesions  Psychiatric:  Pleasant normal affect    CMP  Sodium   Date Value Ref Range Status   09/06/2019 142 136 - 145 mmol/L Final   06/16/2015 146 (H) 137 - 145 MMOL/L Final     Potassium   Date Value Ref Range Status   09/06/2019 4.2 3.5 - 5.1 mmol/L Final   06/16/2015 4.3 3.5 - 5.1 MMOL/L Final     Chloride   Date Value Ref Range Status   09/06/2019 105 95 - 110 mmol/L Final   06/16/2015 106 98 - 107 MMOL/L Final     CO2   Date Value Ref Range Status   09/06/2019 30 22 - 31 mmol/L Final     Glucose   Date Value Ref Range Status   09/06/2019 100 70 - 110 mg/dL Final     Comment:     The ADA recommends the following guidelines for fasting glucose:  Normal:       less than 100 mg/dL  Prediabetes:  100 mg/dL to 125 mg/dL  Diabetes:     126 mg/dL or higher       BUN, Bld   Date Value Ref Range Status   09/06/2019 17 7 - 18 mg/dL Final     Creatinine   Date Value Ref Range Status   09/06/2019 1.10 0.50 - 1.40 mg/dL Final   06/16/2015 1.00 0.52 - 1.04 MG/DL Final      Calcium   Date Value Ref Range Status   09/06/2019 9.4 8.4 - 10.2 mg/dL Final     Total Protein   Date Value Ref Range Status   09/06/2019 7.7 6.0 - 8.4 g/dL Final     Albumin   Date Value Ref Range Status   09/06/2019 4.3 3.5 - 5.2 g/dL Final   06/16/2015 4.0 3.5 - 5.0 G/DL Final     Total Bilirubin   Date Value Ref Range Status   09/06/2019 1.1 0.2 - 1.3 mg/dL Final     Alkaline Phosphatase   Date Value Ref Range Status   09/06/2019 83 38 - 145 U/L Final     AST ([place])   Date Value Ref Range Status   03/30/2016 36 14 - 36 U/L Final     AST   Date Value Ref Range Status   09/06/2019 27 14 - 36 U/L Final     ALT   Date Value Ref Range Status   09/06/2019 20 10 - 44 U/L Final     Anion Gap   Date Value Ref Range Status   09/06/2019 7 (L) 8 - 16 mmol/L Final     eGFR if    Date Value Ref Range Status   09/06/2019 54 (A) >60 mL/min/1.73 m^2 Final     eGFR if non    Date Value Ref Range Status   09/06/2019 47 (A) >60 mL/min/1.73 m^2 Final     Comment:     Calculation used to obtain the estimated glomerular filtration  rate (eGFR) is the CKD-EPI equation.        Lab Results   Component Value Date    WBC 6.06 07/05/2019    HGB 13.0 07/05/2019    HCT 38.5 07/05/2019     (H) 07/05/2019     07/05/2019        Osteopenia of the left hip, normal bone mineral density of the lumbar spine    Consider FDA approved medical therapies in postmenopausal women and men aged 50 years and older, based on the following:    *A hip or vertebral (clinical or morphometric) fracture  *T score less than or equal to -2.5 at the femoral neck or spine after appropriate evaluation to exclude secondary causes.  *Low bone mass -- also known as osteopenia (T score between -1.0 and -2.5 at the femoral neck or spine) and a 10 year probability of hip fracture greater than or equal to 3% or a 10 year probability of major osteoporosis-related fracture greater than or equal to 20% based on the  [place]-adapted WHO algorithm.  *Clinicians judgment and/or patient preference may indicate treatment for people with 10 year fracture probabilities is above or below these levels.      Assessment Plan    [] [id number] ERPR positive HER2 negative right breast cancer invasive ductal carcinoma.  Status post simple mastectomy with lymph node dissection.  Negative lymphovascular invasion, negative margin, lymph node 5/5 negative.    Continue on other routine surveillance protocol    Calcium vitamin daily    RTC 3 months with laboratory studies.    M*Modal dictation

## 2019-10-03 ENCOUNTER — TELEPHONE (OUTPATIENT)
Dept: NEUROSURGERY | Facility: CLINIC | Age: 84
End: 2019-10-03

## 2019-10-03 NOTE — TELEPHONE ENCOUNTER
----- Message from Jennifer Toth sent at 10/3/2019  3:03 PM CDT -----  Type: Needs appointment    Who Called:  Jasmina randall [facility] Call Back Number: [phone]  Additional Information: Calling to schedule appointment for patient/please call back to schedule or advise.

## 2019-10-09 ENCOUNTER — TELEPHONE (OUTPATIENT)
Dept: NEUROSURGERY | Facility: CLINIC | Age: 84
End: 2019-10-09

## 2019-10-09 NOTE — TELEPHONE ENCOUNTER
----- Message from Juana Majano sent at 10/9/2019  2:21 PM CDT -----  Contact: Shireen  ..Type Apoointment Request        Name of Caller:  Shireen/Kera Ventura  When is the first available appointment?  n/a  Symptoms:  2 week hospital f/u  Best Call Back Number:    Additional Information:  Shireen stated pt is being discharged on tomorrow, need to schedule 2 week f/u  Please call to schedule  Thanks

## 2019-12-05 NOTE — PROGRESS NOTES
HPI  83 years old  female referred to [facility] for breast cancer.  Patient has been seen and treated by Dr. Ma for right simple mastectomy.  Patient has breast cancer invasive ductal carcinoma.  Pathology on 03/29/2019 shows pT1c N0 MX tumor 1.1 cm.  Tumor is ERPR positive HER2 negative.  Negative margin and negative lymphovascular invasion and 5/5 lymph node negative for malignancy.    09/06/2019.  Patient here to follow-up no acute events.  Tolerating medication well. Denies chest pain shortness breath.  Denies abdominal pain nausea vomiting diarrhea.  Denies fever chills.  Fourteen point review system negative as above.    12/05/2019:  Oncology follow-up invasive ductal carcinoma no complaints.      Past Medical History:   Diagnosis Date    Anxiety     Arthritis     GERD (gastroesophageal reflux disease)     Hyperlipidemia     Hypertension     Irritable bladder     Osteopenia 9/12/2016    Plantar fascial fibromatosis     PONV (postoperative nausea and vomiting)     [facility]osis     [facility]    Thyroid disease     Vitamin deficiency      Social History     Socioeconomic History    Marital status: Unknown     Spouse name: Not on file    Number of children: Not on file    Years of education: Not on file    Highest education level: Not on file   Occupational History    Not on file   Social Needs    Financial resource strain: Not on file    Food insecurity:     Worry: Not on file     Inability: Not on file    Transportation needs:     Medical: Not on file     Non-medical: Not on file   Tobacco Use    Smoking status: Never Smoker    Smokeless tobacco: Never Used   Substance and Sexual Activity    Alcohol use: No    Drug use: No    Sexual activity: Not on file   Lifestyle    Physical activity:     Days per week: Not on file     Minutes per session: Not on file    Stress: Not on file   Relationships    Social connections:      Talks on phone: Not on file     Gets together: Not on file     Attends Cheondoism service: Not on file     Active member of club or organization: Not on file     Attends meetings of clubs or organizations: Not on file     Relationship status: Not on file   Other Topics Concern    Not on file   Social History Narrative    Not on file       Objective    Physical Exam     Vitals:    12/06/19 1112   BP: 120/71   Pulse: 81   Resp: 16   Temp: 97.8 °F (36.6 °C)        Constitutional:  Alert oriented x3 no acute distress  HENT:  Normocephalic atraumatic pupils groin regard to light extraocular muscles intact  Cardiovascular:  Regular rate and rhythm   No edema, no tenderness in the extremities.   Pulmonary/Chest:  Clear to auscultate  Abdominal:  Soft nontender nondistended bowel sounds x4  Musculoskeletal: Normal range of motion. Gait is normal. No clubbing, cyanosis     Lymphadenopathy:  No evidence of lymphadenopathy  Neurological:  Nonfocal.  Cranial nerves 2-12 grossly intact sensorimotor grossly intact.  Skin:  No rash no lesions  Psychiatric:  Pleasant normal affect    Lab Results   Component Value Date    WBC 5.14 12/06/2019    HGB 12.3 12/06/2019    HCT 36.6 (L) 12/06/2019     (H) 12/06/2019     12/06/2019     CMP  Sodium   Date Value Ref Range Status   12/06/2019 143 136 - 145 mmol/L Final   06/16/2015 146 (H) 137 - 145 MMOL/L Final     Potassium   Date Value Ref Range Status   12/06/2019 4.2 3.5 - 5.1 mmol/L Final   06/16/2015 4.3 3.5 - 5.1 MMOL/L Final     Chloride   Date Value Ref Range Status   12/06/2019 108 95 - 110 mmol/L Final   06/16/2015 106 98 - 107 MMOL/L Final     CO2   Date Value Ref Range Status   12/06/2019 27 22 - 31 mmol/L Final     Glucose   Date Value Ref Range Status   12/06/2019 103 70 - 110 mg/dL Final     Comment:     The ADA recommends the following guidelines for fasting glucose:  Normal:       less than 100 mg/dL  Prediabetes:  100 mg/dL to 125 mg/dL  Diabetes:     126  mg/dL or higher       BUN, Bld   Date Value Ref Range Status   12/06/2019 27 (H) 7 - 18 mg/dL Final     Creatinine   Date Value Ref Range Status   12/06/2019 1.14 0.50 - 1.40 mg/dL Final   06/16/2015 1.00 0.52 - 1.04 MG/DL Final     Calcium   Date Value Ref Range Status   12/06/2019 9.2 8.4 - 10.2 mg/dL Final     Total Protein   Date Value Ref Range Status   12/06/2019 7.2 6.0 - 8.4 g/dL Final     Albumin   Date Value Ref Range Status   12/06/2019 4.1 3.5 - 5.2 g/dL Final   06/16/2015 4.0 3.5 - 5.0 G/DL Final     Total Bilirubin   Date Value Ref Range Status   12/06/2019 0.8 0.2 - 1.3 mg/dL Final     Alkaline Phosphatase   Date Value Ref Range Status   12/06/2019 90 38 - 145 U/L Final     AST ([place])   Date Value Ref Range Status   03/30/2016 36 14 - 36 U/L Final     AST   Date Value Ref Range Status   12/06/2019 43 (H) 14 - 36 U/L Final     ALT   Date Value Ref Range Status   12/06/2019 30 10 - 44 U/L Final     Anion Gap   Date Value Ref Range Status   12/06/2019 8 8 - 16 mmol/L Final     eGFR if    Date Value Ref Range Status   12/06/2019 52 (A) >60 mL/min/1.73 m^2 Final     eGFR if non    Date Value Ref Range Status   12/06/2019 45 (A) >60 mL/min/1.73 m^2 Final     Comment:     Calculation used to obtain the estimated glomerular filtration  rate (eGFR) is the CKD-EPI equation.             Osteopenia of the left hip, normal bone mineral density of the lumbar spine    Consider FDA approved medical therapies in postmenopausal women and men aged 50 years and older, based on the following:    *A hip or vertebral (clinical or morphometric) fracture  *T score less than or equal to -2.5 at the femoral neck or spine after appropriate evaluation to exclude secondary causes.  *Low bone mass -- also known as osteopenia (T score between -1.0 and -2.5 at the femoral neck or spine) and a 10 year probability of hip fracture greater than or equal to 3% or a 10 year probability of major  osteoporosis-related fracture greater than or equal to 20% based on the [place]-adapted WHO algorithm.  *Clinicians judgment and/or patient preference may indicate treatment for people with 10 year fracture probabilities is above or below these levels.      Assessment Plan    [] [id number] ER/MS positive HER2 negative right breast cancer invasive ductal carcinoma.  Status post simple mastectomy with lymph node dissection.  Negative lymphovascular invasion, negative margin, lymph node 5/5 negative.    NCCN guideline protocol    Continue Arimidex plan for total 5 years (Arimidex started on September 2019.)    [] Continue all other routine surveillance protocol    [] Calcium vitamin daily    [] RTC 3 months with laboratory studies.    M*Modal dictation

## 2019-12-06 ENCOUNTER — OFFICE VISIT (OUTPATIENT)
Dept: HEMATOLOGY/ONCOLOGY | Facility: CLINIC | Age: 84
End: 2019-12-06
Payer: MEDICARE

## 2019-12-06 ENCOUNTER — LAB VISIT (OUTPATIENT)
Dept: LAB | Facility: HOSPITAL | Age: 84
End: 2019-12-06
Attending: INTERNAL MEDICINE
Payer: MEDICARE

## 2019-12-06 VITALS
DIASTOLIC BLOOD PRESSURE: 71 MMHG | OXYGEN SATURATION: 96 % | WEIGHT: 131.38 LBS | TEMPERATURE: 98 F | HEART RATE: 81 BPM | SYSTOLIC BLOOD PRESSURE: 120 MMHG | HEIGHT: 66 IN | BODY MASS INDEX: 21.11 KG/M2 | RESPIRATION RATE: 16 BRPM

## 2019-12-06 DIAGNOSIS — M81.0 OSTEOPOROSIS, UNSPECIFIED OSTEOPOROSIS TYPE, UNSPECIFIED PATHOLOGICAL FRACTURE PRESENCE: ICD-10-CM

## 2019-12-06 DIAGNOSIS — Z17.0 MALIGNANT NEOPLASM OF NIPPLE OF RIGHT BREAST IN FEMALE, ESTROGEN RECEPTOR POSITIVE: ICD-10-CM

## 2019-12-06 DIAGNOSIS — Z17.0 MALIGNANT NEOPLASM OF NIPPLE OF RIGHT BREAST IN FEMALE, ESTROGEN RECEPTOR POSITIVE: Primary | ICD-10-CM

## 2019-12-06 DIAGNOSIS — Z09 ONCOLOGY FOLLOW-UP ENCOUNTER: ICD-10-CM

## 2019-12-06 DIAGNOSIS — Z79.811 USE OF ANASTROZOLE (ARIMIDEX): ICD-10-CM

## 2019-12-06 DIAGNOSIS — C50.011 MALIGNANT NEOPLASM OF NIPPLE OF RIGHT BREAST IN FEMALE, ESTROGEN RECEPTOR POSITIVE: ICD-10-CM

## 2019-12-06 DIAGNOSIS — C50.011 MALIGNANT NEOPLASM OF NIPPLE OF RIGHT BREAST IN FEMALE, ESTROGEN RECEPTOR POSITIVE: Primary | ICD-10-CM

## 2019-12-06 LAB
ALBUMIN SERPL BCP-MCNC: 4.1 G/DL (ref 3.5–5.2)
ALP SERPL-CCNC: 90 U/L (ref 38–145)
ALT SERPL W/O P-5'-P-CCNC: 30 U/L (ref 10–44)
ANION GAP SERPL CALC-SCNC: 8 MMOL/L (ref 8–16)
AST SERPL-CCNC: 43 U/L (ref 14–36)
BASOPHILS # BLD AUTO: 0.02 K/UL (ref 0–0.2)
BASOPHILS NFR BLD: 0.4 % (ref 0–1.9)
BILIRUB SERPL-MCNC: 0.8 MG/DL (ref 0.2–1.3)
BUN SERPL-MCNC: 27 MG/DL (ref 7–18)
CALCIUM SERPL-MCNC: 9.2 MG/DL (ref 8.4–10.2)
CHLORIDE SERPL-SCNC: 108 MMOL/L (ref 95–110)
CO2 SERPL-SCNC: 27 MMOL/L (ref 22–31)
CREAT SERPL-MCNC: 1.14 MG/DL (ref 0.5–1.4)
DIFFERENTIAL METHOD: ABNORMAL
EOSINOPHIL # BLD AUTO: 0.1 K/UL (ref 0–0.5)
EOSINOPHIL NFR BLD: 1 % (ref 0–8)
ERYTHROCYTE [DISTWIDTH] IN BLOOD BY AUTOMATED COUNT: 12.1 % (ref 11.5–14.5)
EST. GFR  (AFRICAN AMERICAN): 52 ML/MIN/1.73 M^2
EST. GFR  (NON AFRICAN AMERICAN): 45 ML/MIN/1.73 M^2
GLUCOSE SERPL-MCNC: 103 MG/DL (ref 70–110)
HCT VFR BLD AUTO: 36.6 % (ref 37–48.5)
HGB BLD-MCNC: 12.3 G/DL (ref 12–16)
IMM GRANULOCYTES # BLD AUTO: 0.02 K/UL (ref 0–0.04)
IMM GRANULOCYTES NFR BLD AUTO: 0.4 % (ref 0–0.5)
LYMPHOCYTES # BLD AUTO: 1.2 K/UL (ref 1–4.8)
LYMPHOCYTES NFR BLD: 22.8 % (ref 18–48)
MCH RBC QN AUTO: 34.5 PG (ref 27–31)
MCHC RBC AUTO-ENTMCNC: 33.6 G/DL (ref 32–36)
MCV RBC AUTO: 103 FL (ref 82–98)
MONOCYTES # BLD AUTO: 0.4 K/UL (ref 0.3–1)
MONOCYTES NFR BLD: 7 % (ref 4–15)
NEUTROPHILS # BLD AUTO: 3.5 K/UL (ref 1.8–7.7)
NEUTROPHILS NFR BLD: 68.4 % (ref 38–73)
NRBC BLD-RTO: 0 /100 WBC
PLATELET # BLD AUTO: 174 K/UL (ref 150–350)
PMV BLD AUTO: 10.9 FL (ref 9.2–12.9)
POTASSIUM SERPL-SCNC: 4.2 MMOL/L (ref 3.5–5.1)
PROT SERPL-MCNC: 7.2 G/DL (ref 6–8.4)
RBC # BLD AUTO: 3.57 M/UL (ref 4–5.4)
SODIUM SERPL-SCNC: 143 MMOL/L (ref 136–145)
WBC # BLD AUTO: 5.14 K/UL (ref 3.9–12.7)

## 2019-12-06 PROCEDURE — 1159F MED LIST DOCD IN RCRD: CPT | Mod: ,,, | Performed by: INTERNAL MEDICINE

## 2019-12-06 PROCEDURE — 99999 PR PBB SHADOW E&M-EST. PATIENT-LVL III: CPT | Mod: PBBFAC,,, | Performed by: INTERNAL MEDICINE

## 2019-12-06 PROCEDURE — 1125F AMNT PAIN NOTED PAIN PRSNT: CPT | Mod: ,,, | Performed by: INTERNAL MEDICINE

## 2019-12-06 PROCEDURE — 85025 COMPLETE CBC W/AUTO DIFF WBC: CPT

## 2019-12-06 PROCEDURE — 99214 PR OFFICE/OUTPT VISIT, EST, LEVL IV, 30-39 MIN: ICD-10-PCS | Mod: S$PBB,,, | Performed by: INTERNAL MEDICINE

## 2019-12-06 PROCEDURE — 80053 COMPREHEN METABOLIC PANEL: CPT | Mod: PN

## 2019-12-06 PROCEDURE — 99213 OFFICE O/P EST LOW 20 MIN: CPT | Mod: PBBFAC,PN | Performed by: INTERNAL MEDICINE

## 2019-12-06 PROCEDURE — 99999 PR PBB SHADOW E&M-EST. PATIENT-LVL III: ICD-10-PCS | Mod: PBBFAC,,, | Performed by: INTERNAL MEDICINE

## 2019-12-06 PROCEDURE — 1159F PR MEDICATION LIST DOCUMENTED IN MEDICAL RECORD: ICD-10-PCS | Mod: ,,, | Performed by: INTERNAL MEDICINE

## 2019-12-06 PROCEDURE — 1125F PR PAIN SEVERITY QUANTIFIED, PAIN PRESENT: ICD-10-PCS | Mod: ,,, | Performed by: INTERNAL MEDICINE

## 2019-12-06 PROCEDURE — 99214 OFFICE O/P EST MOD 30 MIN: CPT | Mod: S$PBB,,, | Performed by: INTERNAL MEDICINE

## 2019-12-06 PROCEDURE — 85025 COMPLETE CBC W/AUTO DIFF WBC: CPT | Mod: PN

## 2019-12-06 PROCEDURE — 80053 COMPREHEN METABOLIC PANEL: CPT

## 2019-12-06 PROCEDURE — 36415 COLL VENOUS BLD VENIPUNCTURE: CPT | Mod: PN

## 2019-12-09 PROBLEM — Z09 ONCOLOGY FOLLOW-UP ENCOUNTER: Status: RESOLVED | Noted: 2019-09-06 | Resolved: 2019-12-09

## 2019-12-26 ENCOUNTER — TELEPHONE (OUTPATIENT)
Dept: NEUROSURGERY | Facility: CLINIC | Age: 84
End: 2019-12-26

## 2019-12-26 NOTE — TELEPHONE ENCOUNTER
Spoke with patient's son regarding scheduling a follow up. Did inform them that Dr. Lin was no longer with [facility], and they stated that patient was still not interested at all in having surgery. Son states that they would just like to have her MRI repeated to ensure that everything is stable at this point, and we can just contact them regarding the results. Informed him that as soon as Cecy gets back in we would address it with her, and have her order imaging. Son states that he is fine with this plan.

## 2019-12-26 NOTE — TELEPHONE ENCOUNTER
----- Message from Naina Gallego sent at 12/26/2019  9:52 AM CST -----  Contact: Santana([email or web address])  Pt needs follow up appt    Please advise, can be reached at [phone] Rei(son)

## 2020-02-27 ENCOUNTER — TELEPHONE (OUTPATIENT)
Dept: HEMATOLOGY/ONCOLOGY | Facility: CLINIC | Age: 85
End: 2020-02-27

## 2020-02-27 NOTE — TELEPHONE ENCOUNTER
Attempted to contact pt to r/s her appt on 3/6/20 due to Dr Guerin not being in clinic and could r/s her to 3/13/20 the following Friday. Left message with contact number.

## 2020-03-02 ENCOUNTER — TELEPHONE (OUTPATIENT)
Dept: HEMATOLOGY/ONCOLOGY | Facility: CLINIC | Age: 85
End: 2020-03-02

## 2020-03-02 NOTE — TELEPHONE ENCOUNTER
Attempted to contact pt on all contact numbers. Pt needs to be r/s due to Dr Guerin not in clinic on 3/6/20. Left messages with contact number.

## 2020-03-02 NOTE — TELEPHONE ENCOUNTER
Spoke with pt's son to get pt r/s to 3/13/20 with Dr Guerin @[id number] due to Dr Guerin not in clinic on 3/6/20. Pt will still do labs on day scheduled already per Pt's son request.

## 2020-03-02 NOTE — TELEPHONE ENCOUNTER
----- Message from Kera Davidson sent at 3/2/2020 11:14 AM CST -----  Contact: sonRei  Type:  Patient Returning Call    Who Called:  SonRei  Who Left Message for Patient:  Gab  Does the patient know what this is regarding?:  yes  Best Call Back Number:  [phone]  Additional Information:  Patient's son states it is regarding rescheduling an appointment. Please call Rei. Thanks!

## 2020-03-12 NOTE — PROGRESS NOTES
HPI    83 years old  female referred to [facility] for breast cancer.  Patient has been seen and treated by Dr. Ma for right simple mastectomy.  Patient has breast cancer invasive ductal carcinoma.  Pathology on 03/29/2019 shows pT1c N0 MX tumor 1.1 cm.  Tumor is ERPR positive HER2 negative.  Negative margin and negative lymphovascular invasion and 5/5 lymph node negative for malignancy.    09/06/2019.  Patient here to follow-up no acute events.  Tolerating medication well. Denies chest pain shortness breath.  Denies abdominal pain nausea vomiting diarrhea.  Denies fever chills.  Fourteen point review system negative as above.    12/05/2019:  Oncology follow-up invasive ductal carcinoma no complaints.      Past Medical History:   Diagnosis Date    Anxiety     Arthritis     GERD (gastroesophageal reflux disease)     Hyperlipidemia     Hypertension     Irritable bladder     Osteopenia 9/12/2016    Plantar fascial fibromatosis     PONV (postoperative nausea and vomiting)     [facility]osis     [facility]    Thyroid disease     Vitamin deficiency      Social History     Socioeconomic History    Marital status:      Spouse name: Not on file    Number of children: Not on file    Years of education: Not on file    Highest education level: Not on file   Occupational History    Not on file   Social Needs    Financial resource strain: Not on file    Food insecurity:     Worry: Not on file     Inability: Not on file    Transportation needs:     Medical: Not on file     Non-medical: Not on file   Tobacco Use    Smoking status: Never Smoker    Smokeless tobacco: Never Used   Substance and Sexual Activity    Alcohol use: No    Drug use: No    Sexual activity: Not on file   Lifestyle    Physical activity:     Days per week: Not on file     Minutes per session: Not on file    Stress: Not on file   Relationships    Social  connections:     Talks on phone: Not on file     Gets together: Not on file     Attends Mandaeism service: Not on file     Active member of club or organization: Not on file     Attends meetings of clubs or organizations: Not on file     Relationship status: Not on file   Other Topics Concern    Not on file   Social History Narrative    Not on file       Objective    Physical Exam   Vitals:    03/13/20 1023   BP: (!) 140/71   Pulse: 67   Resp: 14   Temp: 98.2 °F (36.8 °C)        Constitutional:  Alert oriented x3 no acute distress  HENT:  Normocephalic atraumatic pupils groin regard to light extraocular muscles intact  Cardiovascular:  Regular rate and rhythm   No edema, no tenderness in the extremities.   Pulmonary/Chest:  Clear to auscultate  Abdominal:  Soft nontender nondistended bowel sounds x4  Musculoskeletal: Normal range of motion. Gait is normal. No clubbing, cyanosis     Lymphadenopathy:  No evidence of lymphadenopathy  Neurological:  Nonfocal.  Cranial nerves 2-12 grossly intact sensorimotor grossly intact.  Skin:  No rash no lesions  Psychiatric:  Pleasant normal affect    Lab Results   Component Value Date    WBC 5.14 12/06/2019    HGB 12.3 12/06/2019    HCT 36.6 (L) 12/06/2019     (H) 12/06/2019     12/06/2019     CMP  Sodium   Date Value Ref Range Status   03/06/2020 142 136 - 145 mmol/L Final   03/06/2020 142 136 - 145 mmol/L Final   06/16/2015 146 (H) 137 - 145 MMOL/L Final     Potassium   Date Value Ref Range Status   03/06/2020 3.9 3.5 - 5.1 mmol/L Final   03/06/2020 3.9 3.5 - 5.1 mmol/L Final   06/16/2015 4.3 3.5 - 5.1 MMOL/L Final     Chloride   Date Value Ref Range Status   03/06/2020 106 95 - 110 mmol/L Final   03/06/2020 106 95 - 110 mmol/L Final   06/16/2015 106 98 - 107 MMOL/L Final     CO2   Date Value Ref Range Status   03/06/2020 28 22 - 31 mmol/L Final   03/06/2020 28 22 - 31 mmol/L Final     Glucose   Date Value Ref Range Status   03/06/2020 90 70 - 110 mg/dL Final      Comment:     The ADA recommends the following guidelines for fasting glucose:  Normal:       less than 100 mg/dL  Prediabetes:  100 mg/dL to 125 mg/dL  Diabetes:     126 mg/dL or higher     03/06/2020 90 70 - 110 mg/dL Final     Comment:     The ADA recommends the following guidelines for fasting glucose:  Normal:       less than 100 mg/dL  Prediabetes:  100 mg/dL to 125 mg/dL  Diabetes:     126 mg/dL or higher       BUN, Bld   Date Value Ref Range Status   03/06/2020 26 (H) 7 - 18 mg/dL Final   03/06/2020 26 (H) 7 - 18 mg/dL Final     Creatinine   Date Value Ref Range Status   03/06/2020 1.01 0.50 - 1.40 mg/dL Final   03/06/2020 1.01 0.50 - 1.40 mg/dL Final   06/16/2015 1.00 0.52 - 1.04 MG/DL Final     Calcium   Date Value Ref Range Status   03/06/2020 9.3 8.4 - 10.2 mg/dL Final   03/06/2020 9.3 8.4 - 10.2 mg/dL Final     Total Protein   Date Value Ref Range Status   03/06/2020 7.3 6.0 - 8.4 g/dL Final   03/06/2020 7.3 6.0 - 8.4 g/dL Final     Albumin   Date Value Ref Range Status   03/06/2020 4.0 3.5 - 5.2 g/dL Final   03/06/2020 4.0 3.5 - 5.2 g/dL Final   06/16/2015 4.0 3.5 - 5.0 G/DL Final     Total Bilirubin   Date Value Ref Range Status   03/06/2020 0.8 0.2 - 1.3 mg/dL Final   03/06/2020 0.8 0.2 - 1.3 mg/dL Final     Alkaline Phosphatase   Date Value Ref Range Status   03/06/2020 90 38 - 145 U/L Final   03/06/2020 90 38 - 145 U/L Final     AST ([place])   Date Value Ref Range Status   03/30/2016 36 14 - 36 U/L Final     AST   Date Value Ref Range Status   03/06/2020 28 14 - 36 U/L Final   03/06/2020 28 14 - 36 U/L Final     ALT   Date Value Ref Range Status   03/06/2020 14 0 - 35 U/L Final   03/06/2020 14 0 - 35 U/L Final     Anion Gap   Date Value Ref Range Status   03/06/2020 8 8 - 16 mmol/L Final   03/06/2020 8 8 - 16 mmol/L Final     eGFR if    Date Value Ref Range Status   03/06/2020 60 >60 mL/min/1.73 m^2 Final   03/06/2020 60 >60 mL/min/1.73 m^2 Final     eGFR if non African  American   Date Value Ref Range Status   03/06/2020 52 (A) >60 mL/min/1.73 m^2 Final     Comment:     Calculation used to obtain the estimated glomerular filtration  rate (eGFR) is the CKD-EPI equation.      03/06/2020 52 (A) >60 mL/min/1.73 m^2 Final     Comment:     Calculation used to obtain the estimated glomerular filtration  rate (eGFR) is the CKD-EPI equation.             Osteopenia of the left hip, normal bone mineral density of the lumbar spine    Consider FDA approved medical therapies in postmenopausal women and men aged 50 years and older, based on the following:    *A hip or vertebral (clinical or morphometric) fracture  *T score less than or equal to -2.5 at the femoral neck or spine after appropriate evaluation to exclude secondary causes.  *Low bone mass -- also known as osteopenia (T score between -1.0 and -2.5 at the femoral neck or spine) and a 10 year probability of hip fracture greater than or equal to 3% or a 10 year probability of major osteoporosis-related fracture greater than or equal to 20% based on the [place]-adapted WHO algorithm.  *Clinicians judgment and/or patient preference may indicate treatment for people with 10 year fracture probabilities is above or below these levels.      Assessment Plan    [] [id number] ER/IN positive HER2 negative right breast cancer invasive ductal carcinoma.  Status post simple mastectomy with lymph node dissection.  Negative lymphovascular invasion, negative margin, lymph node 5/5 negative.    Continue Arimidex plan for total 5 years (Arimidex started on September 2019.)    [] Continue all other routine surveillance protocol    [] Dysuria  > UA empirically Bactrim 1 pill b.i.d. 10 days    [] Mammogram  > patient will have mammogram and this will be followed by primary care physicians.    [] Calcium vitamin daily    [] RTC 3 months with laboratory studies.    M*Modal dictation

## 2020-03-13 ENCOUNTER — OFFICE VISIT (OUTPATIENT)
Dept: HEMATOLOGY/ONCOLOGY | Facility: CLINIC | Age: 85
End: 2020-03-13
Payer: MEDICARE

## 2020-03-13 VITALS
OXYGEN SATURATION: 97 % | WEIGHT: 136.44 LBS | TEMPERATURE: 98 F | DIASTOLIC BLOOD PRESSURE: 71 MMHG | HEART RATE: 67 BPM | BODY MASS INDEX: 21.93 KG/M2 | SYSTOLIC BLOOD PRESSURE: 140 MMHG | HEIGHT: 66 IN | RESPIRATION RATE: 14 BRPM

## 2020-03-13 DIAGNOSIS — N34.2 INFECTIVE URETHRITIS: ICD-10-CM

## 2020-03-13 DIAGNOSIS — Z17.0 MALIGNANT NEOPLASM OF NIPPLE OF RIGHT BREAST IN FEMALE, ESTROGEN RECEPTOR POSITIVE: Primary | ICD-10-CM

## 2020-03-13 DIAGNOSIS — D72.829 LEUKOCYTOSIS, UNSPECIFIED TYPE: ICD-10-CM

## 2020-03-13 DIAGNOSIS — C50.011 MALIGNANT NEOPLASM OF NIPPLE OF RIGHT BREAST IN FEMALE, ESTROGEN RECEPTOR POSITIVE: Primary | ICD-10-CM

## 2020-03-13 DIAGNOSIS — M81.0 OSTEOPOROSIS, UNSPECIFIED OSTEOPOROSIS TYPE, UNSPECIFIED PATHOLOGICAL FRACTURE PRESENCE: ICD-10-CM

## 2020-03-13 DIAGNOSIS — Z79.811 USE OF ANASTROZOLE (ARIMIDEX): ICD-10-CM

## 2020-03-13 PROCEDURE — 99214 OFFICE O/P EST MOD 30 MIN: CPT | Mod: S$PBB,,, | Performed by: INTERNAL MEDICINE

## 2020-03-13 PROCEDURE — 99999 PR PBB SHADOW E&M-EST. PATIENT-LVL III: ICD-10-PCS | Mod: PBBFAC,,, | Performed by: INTERNAL MEDICINE

## 2020-03-13 PROCEDURE — 99213 OFFICE O/P EST LOW 20 MIN: CPT | Mod: PBBFAC,PN | Performed by: INTERNAL MEDICINE

## 2020-03-13 PROCEDURE — 99999 PR PBB SHADOW E&M-EST. PATIENT-LVL III: CPT | Mod: PBBFAC,,, | Performed by: INTERNAL MEDICINE

## 2020-03-13 PROCEDURE — 99214 PR OFFICE/OUTPT VISIT, EST, LEVL IV, 30-39 MIN: ICD-10-PCS | Mod: S$PBB,,, | Performed by: INTERNAL MEDICINE

## 2020-03-13 RX ORDER — ANASTROZOLE 1 MG/1
1 TABLET ORAL DAILY
Qty: 90 TABLET | Refills: 11 | Status: SHIPPED | OUTPATIENT
Start: 2020-03-13 | End: 2021-04-13

## 2020-03-13 RX ORDER — SULFAMETHOXAZOLE AND TRIMETHOPRIM 400; 80 MG/1; MG/1
1 TABLET ORAL 2 TIMES DAILY
Qty: 20 TABLET | Refills: 0 | Status: SHIPPED | OUTPATIENT
Start: 2020-03-13 | End: 2020-03-23

## 2020-06-12 ENCOUNTER — HOSPITAL ENCOUNTER (OUTPATIENT)
Dept: RADIOLOGY | Facility: HOSPITAL | Age: 85
Discharge: HOME | End: 2020-06-12
Attending: INTERNAL MEDICINE
Payer: MEDICARE

## 2020-06-12 ENCOUNTER — OFFICE VISIT (OUTPATIENT)
Dept: HEMATOLOGY/ONCOLOGY | Facility: CLINIC | Age: 85
End: 2020-06-12
Payer: MEDICARE

## 2020-06-12 VITALS
HEART RATE: 68 BPM | DIASTOLIC BLOOD PRESSURE: 70 MMHG | RESPIRATION RATE: 13 BRPM | OXYGEN SATURATION: 99 % | TEMPERATURE: 98 F | WEIGHT: 140.19 LBS | BODY MASS INDEX: 23.93 KG/M2 | SYSTOLIC BLOOD PRESSURE: 162 MMHG | HEIGHT: 64 IN

## 2020-06-12 DIAGNOSIS — Z17.0 MALIGNANT NEOPLASM OF NIPPLE OF RIGHT BREAST IN FEMALE, ESTROGEN RECEPTOR POSITIVE: Primary | ICD-10-CM

## 2020-06-12 DIAGNOSIS — R60.9 EDEMA, UNSPECIFIED TYPE: ICD-10-CM

## 2020-06-12 DIAGNOSIS — C50.011 MALIGNANT NEOPLASM OF NIPPLE OF RIGHT BREAST IN FEMALE, ESTROGEN RECEPTOR POSITIVE: Primary | ICD-10-CM

## 2020-06-12 DIAGNOSIS — Z12.31 ENCOUNTER FOR SCREENING MAMMOGRAM FOR MALIGNANT NEOPLASM OF BREAST: ICD-10-CM

## 2020-06-12 PROCEDURE — 99214 OFFICE O/P EST MOD 30 MIN: CPT | Mod: S$PBB,,, | Performed by: INTERNAL MEDICINE

## 2020-06-12 PROCEDURE — 93970 EXTREMITY STUDY: CPT | Mod: TC,PO

## 2020-06-12 PROCEDURE — 93970 EXTREMITY STUDY: CPT | Mod: 26,,, | Performed by: RADIOLOGY

## 2020-06-12 PROCEDURE — 99213 OFFICE O/P EST LOW 20 MIN: CPT | Mod: PBBFAC,PN,25 | Performed by: INTERNAL MEDICINE

## 2020-06-12 PROCEDURE — 99999 PR PBB SHADOW E&M-EST. PATIENT-LVL III: CPT | Mod: PBBFAC,,, | Performed by: INTERNAL MEDICINE

## 2020-06-12 PROCEDURE — 99999 PR PBB SHADOW E&M-EST. PATIENT-LVL III: ICD-10-PCS | Mod: PBBFAC,,, | Performed by: INTERNAL MEDICINE

## 2020-06-12 PROCEDURE — 99214 PR OFFICE/OUTPT VISIT, EST, LEVL IV, 30-39 MIN: ICD-10-PCS | Mod: S$PBB,,, | Performed by: INTERNAL MEDICINE

## 2020-06-12 PROCEDURE — 93970 US LOWER EXTREMITY VEINS BILATERAL: ICD-10-PCS | Mod: 26,,, | Performed by: RADIOLOGY

## 2020-06-12 NOTE — PROGRESS NOTES
HPI    84 years old  female referred to H[facility]ology Oncology Clinic for breast cancer.  Patient has been seen and treated by Dr. Ma for right simple mastectomy.  Patient has breast cancer invasive ductal carcinoma.  Pathology on 03/29/2019 shows pT1c N0 MX tumor 1.1 cm.  Tumor is ERPR positive HER2 negative.  Negative margin and negative lymphovascular invasion and 5/5 lymph node negative for malignancy.    09/06/2019.  Patient here to follow-up no acute events.  Tolerating medication well. Denies chest pain shortness breath.  Denies abdominal pain nausea vomiting diarrhea.  Denies fever chills.  Fourteen point review system negative as above.    12/05/2019:  Oncology follow-up invasive ductal carcinoma no complaints.    06/12/2020:  Follow-up visit evaluation      Past Medical History:   Diagnosis Date    Anxiety     Arthritis     GERD (gastroesophageal reflux disease)     Hyperlipidemia     Hypertension     Irritable bladder     Osteopenia 9/12/2016    Plantar fascial fibromatosis     PONV (postoperative nausea and vomiting)     [facility]    Thyroid disease     Vitamin deficiency      Social History     Socioeconomic History    Marital status:      Spouse name: Not on file    Number of children: Not on file    Years of education: Not on file    Highest education level: Not on file   Occupational History    Not on file   Social Needs    Financial resource strain: Not on file    Food insecurity:     Worry: Not on file     Inability: Not on file    Transportation needs:     Medical: Not on file     Non-medical: Not on file   Tobacco Use    Smoking status: Never Smoker    Smokeless tobacco: Never Used   Substance and Sexual Activity    Alcohol use: No    Drug use: No    Sexual activity: Not on file   Lifestyle    Physical activity:     Days per week: Not on file     Minutes per session: Not on file    Stress: Not on  file   Relationships    Social connections:     Talks on phone: Not on file     Gets together: Not on file     Attends Rastafari service: Not on file     Active member of club or organization: Not on file     Attends meetings of clubs or organizations: Not on file     Relationship status: Not on file   Other Topics Concern    Not on file   Social History Narrative    Not on file       Objective    Physical Exam   Vitals:    06/12/20 1016   BP: (!) 162/70   Pulse: 68   Resp: 13   Temp: 97.6 °F (36.4 °C)        Constitutional:  Alert oriented x3 no acute distress  HENT:  Normocephalic atraumatic pupils groin regard to light extraocular muscles intact  Cardiovascular:  Regular rate and rhythm   No edema, no tenderness in the extremities.   Pulmonary/Chest:  Clear to auscultate  Abdominal:  Soft nontender nondistended bowel sounds x4  Musculoskeletal: Normal range of motion. Gait is normal. No clubbing, cyanosis     Lymphadenopathy:  Right lower extremity edema  Neurological:  Nonfocal.  Cranial nerves 2-12 grossly intact sensorimotor grossly intact.  Skin:  No rash no lesions  Psychiatric:  Pleasant normal affect    Lab Results   Component Value Date    WBC 5.14 12/06/2019    HGB 12.3 12/06/2019    HCT 36.6 (L) 12/06/2019     (H) 12/06/2019     12/06/2019     CMP  Sodium   Date Value Ref Range Status   06/12/2020 143 136 - 145 mmol/L Final   06/16/2015 146 (H) 137 - 145 MMOL/L Final     Potassium   Date Value Ref Range Status   06/12/2020 4.2 3.5 - 5.1 mmol/L Final   06/16/2015 4.3 3.5 - 5.1 MMOL/L Final     Chloride   Date Value Ref Range Status   06/12/2020 107 95 - 110 mmol/L Final   06/16/2015 106 98 - 107 MMOL/L Final     CO2   Date Value Ref Range Status   06/12/2020 31 22 - 31 mmol/L Final     Glucose   Date Value Ref Range Status   06/12/2020 117 (H) 70 - 110 mg/dL Final     Comment:     The ADA recommends the following guidelines for fasting glucose:  Normal:       less than 100  mg/dL  Prediabetes:  100 mg/dL to 125 mg/dL  Diabetes:     126 mg/dL or higher       BUN, Bld   Date Value Ref Range Status   06/12/2020 16 7 - 18 mg/dL Final     Creatinine   Date Value Ref Range Status   06/12/2020 0.81 0.50 - 1.40 mg/dL Final   06/16/2015 1.00 0.52 - 1.04 MG/DL Final     Calcium   Date Value Ref Range Status   06/12/2020 9.3 8.4 - 10.2 mg/dL Final     Total Protein   Date Value Ref Range Status   06/12/2020 7.4 6.0 - 8.4 g/dL Final     Albumin   Date Value Ref Range Status   06/12/2020 4.1 3.5 - 5.2 g/dL Final   06/16/2015 4.0 3.5 - 5.0 G/DL Final     Total Bilirubin   Date Value Ref Range Status   06/12/2020 0.7 0.2 - 1.3 mg/dL Final     Alkaline Phosphatase   Date Value Ref Range Status   06/12/2020 79 38 - 145 U/L Final     AST ([place])   Date Value Ref Range Status   03/30/2016 36 14 - 36 U/L Final     AST   Date Value Ref Range Status   06/12/2020 35 14 - 36 U/L Final     ALT   Date Value Ref Range Status   06/12/2020 20 0 - 35 U/L Final     Anion Gap   Date Value Ref Range Status   06/12/2020 5 (L) 8 - 16 mmol/L Final     eGFR if    Date Value Ref Range Status   06/12/2020 >60 >60 mL/min/1.73 m^2 Final     eGFR if non    Date Value Ref Range Status   06/12/2020 >60 >60 mL/min/1.73 m^2 Final     Comment:     Calculation used to obtain the estimated glomerular filtration  rate (eGFR) is the CKD-EPI equation.             Osteopenia of the left hip, normal bone mineral density of the lumbar spine    Consider FDA approved medical therapies in postmenopausal women and men aged 50 years and older, based on the following:    *A hip or vertebral (clinical or morphometric) fracture  *T score less than or equal to -2.5 at the femoral neck or spine after appropriate evaluation to exclude secondary causes.  *Low bone mass -- also known as osteopenia (T score between -1.0 and -2.5 at the femoral neck or spine) and a 10 year probability of hip fracture greater than  or equal to 3% or a 10 year probability of major osteoporosis-related fracture greater than or equal to 20% based on the [place]-adapted WHO algorithm.  *Clinicians judgment and/or patient preference may indicate treatment for people with 10 year fracture probabilities is above or below these levels.      Assessment Plan    [] [id number] ER/UT positive HER2 negative right breast cancer invasive ductal carcinoma.  Status post simple mastectomy with lymph node dissection.  Negative lymphovascular invasion, negative margin, lymph node 5/5 negative.    Continue Arimidex plan for total 5 years (Arimidex started on September 2019.)    [] Continue all other routine surveillance protocol    [] No extremity edema bilateral right greater than left order ultrasound venous Doppler Rel DVT    [] Mammogram  > patient will have mammogram and this will be followed by primary care physicians.    [] Calcium vitamin daily    [] RTC 3 months with laboratory studies.    M*Modal dictation

## 2020-09-10 ENCOUNTER — HOSPITAL ENCOUNTER (OUTPATIENT)
Dept: RADIOLOGY | Facility: HOSPITAL | Age: 85
Discharge: HOME | End: 2020-09-10
Attending: INTERNAL MEDICINE
Payer: MEDICARE

## 2020-09-10 DIAGNOSIS — Z12.31 ENCOUNTER FOR SCREENING MAMMOGRAM FOR MALIGNANT NEOPLASM OF BREAST: ICD-10-CM

## 2020-09-10 DIAGNOSIS — Z17.0 MALIGNANT NEOPLASM OF NIPPLE OF RIGHT BREAST IN FEMALE, ESTROGEN RECEPTOR POSITIVE: ICD-10-CM

## 2020-09-10 DIAGNOSIS — C50.011 MALIGNANT NEOPLASM OF NIPPLE OF RIGHT BREAST IN FEMALE, ESTROGEN RECEPTOR POSITIVE: ICD-10-CM

## 2020-09-10 PROCEDURE — 77063 BREAST TOMOSYNTHESIS BI: CPT | Mod: 26,,, | Performed by: RADIOLOGY

## 2020-09-10 PROCEDURE — 77067 SCR MAMMO BI INCL CAD: CPT | Mod: 26,,, | Performed by: RADIOLOGY

## 2020-09-10 PROCEDURE — 77067 MAMMO DIGITAL SCREENING LEFT WITH TOMO: ICD-10-PCS | Mod: 26,,, | Performed by: RADIOLOGY

## 2020-09-10 PROCEDURE — 77067 SCR MAMMO BI INCL CAD: CPT | Mod: TC,PO

## 2020-09-10 PROCEDURE — 77063 MAMMO DIGITAL SCREENING LEFT WITH TOMO: ICD-10-PCS | Mod: 26,,, | Performed by: RADIOLOGY

## 2020-09-11 ENCOUNTER — OFFICE VISIT (OUTPATIENT)
Dept: HEMATOLOGY/ONCOLOGY | Facility: CLINIC | Age: 85
End: 2020-09-11
Payer: MEDICARE

## 2020-09-11 VITALS
SYSTOLIC BLOOD PRESSURE: 129 MMHG | TEMPERATURE: 98 F | OXYGEN SATURATION: 99 % | BODY MASS INDEX: 21.74 KG/M2 | WEIGHT: 127.31 LBS | DIASTOLIC BLOOD PRESSURE: 64 MMHG | RESPIRATION RATE: 20 BRPM | HEART RATE: 57 BPM | HEIGHT: 64 IN

## 2020-09-11 DIAGNOSIS — Z17.0 MALIGNANT NEOPLASM OF NIPPLE OF RIGHT BREAST IN FEMALE, ESTROGEN RECEPTOR POSITIVE: Primary | ICD-10-CM

## 2020-09-11 DIAGNOSIS — C50.011 MALIGNANT NEOPLASM OF NIPPLE OF RIGHT BREAST IN FEMALE, ESTROGEN RECEPTOR POSITIVE: Primary | ICD-10-CM

## 2020-09-11 PROCEDURE — 99214 OFFICE O/P EST MOD 30 MIN: CPT | Mod: PBBFAC,PN | Performed by: INTERNAL MEDICINE

## 2020-09-11 PROCEDURE — 99999 PR PBB SHADOW E&M-EST. PATIENT-LVL IV: ICD-10-PCS | Mod: PBBFAC,,, | Performed by: INTERNAL MEDICINE

## 2020-09-11 PROCEDURE — 99214 PR OFFICE/OUTPT VISIT, EST, LEVL IV, 30-39 MIN: ICD-10-PCS | Mod: S$PBB,,, | Performed by: INTERNAL MEDICINE

## 2020-09-11 PROCEDURE — 99214 OFFICE O/P EST MOD 30 MIN: CPT | Mod: S$PBB,,, | Performed by: INTERNAL MEDICINE

## 2020-09-11 PROCEDURE — 99999 PR PBB SHADOW E&M-EST. PATIENT-LVL IV: CPT | Mod: PBBFAC,,, | Performed by: INTERNAL MEDICINE

## 2020-09-11 RX ORDER — ESTRADIOL 0.1 MG/G
1 CREAM VAGINAL DAILY
COMMUNITY
Start: 2020-07-15

## 2020-09-11 RX ORDER — DOXAZOSIN 1 MG/1
1 TABLET ORAL NIGHTLY
COMMUNITY
Start: 2020-07-30 | End: 2021-02-11

## 2020-09-11 NOTE — PROGRESS NOTES
HPI    84 years old  female referred [facility] for breast cancer.  Patient has been seen and treated by Dr. Ma for right simple mastectomy.  Patient has breast cancer invasive ductal carcinoma.  Pathology on 03/29/2019 shows pT1c N0 MX tumor 1.1 cm.  Tumor is ERPR positive HER2 negative.  Negative margin and negative lymphovascular invasion and 5/5 lymph node negative for malignancy.    09/06/2019.  Patient here to follow-up no acute events.  Tolerating medication well. Denies chest pain shortness breath.  Denies abdominal pain nausea vomiting diarrhea.  Denies fever chills.  Fourteen point review system negative as above.    12/05/2019:  Oncology follow-up invasive ductal carcinoma no complaints.    06/12/2020:  Follow-up visit evaluation    09/11/2020 patient for evaluation visit.    Past Medical History:   Diagnosis Date    Anxiety     Arthritis     GERD (gastroesophageal reflux disease)     Hyperlipidemia     Hypertension     Irritable bladder     Osteopenia 9/12/2016    Plantar fascial fibromatosis     PONV (postoperative nausea and vomiting)     Psychosis     [facility]    Thyroid disease     Vitamin deficiency      Social History     Socioeconomic History    Marital status:      Spouse name: Not on file    Number of children: Not on file    Years of education: Not on file    Highest education level: Not on file   Occupational History    Not on file   Social Needs    Financial resource strain: Not on file    Food insecurity     Worry: Not on file     Inability: Not on file    Transportation needs     Medical: Not on file     Non-medical: Not on file   Tobacco Use    Smoking status: Never Smoker    Smokeless tobacco: Never Used   Substance and Sexual Activity    Alcohol use: No    Drug use: No    Sexual activity: Not on file   Lifestyle    Physical activity     Days per week: Not on file     Minutes per  session: Not on file    Stress: Not on file   Relationships    Social connections     Talks on phone: Not on file     Gets together: Not on file     Attends Faith service: Not on file     Active member of club or organization: Not on file     Attends meetings of clubs or organizations: Not on file     Relationship status: Not on file   Other Topics Concern    Not on file   Social History Narrative    Not on file       Objective    Physical Exam   Vitals:    09/11/20 1010   BP: 129/64   Pulse: (!) 57   Resp: 20   Temp: 97.5 °F (36.4 °C)        Constitutional:  Alert oriented x3 no acute distress  HENT:  Normocephalic atraumatic pupils groin regard to light extraocular muscles intact  Cardiovascular:  Regular rate and rhythm   No edema, no tenderness in the extremities.   Pulmonary/Chest:  Clear to auscultate  Abdominal:  Soft nontender nondistended bowel sounds x4  Musculoskeletal: Normal range of motion. Gait is normal. No clubbing, cyanosis     Lymphadenopathy:  Right lower extremity edema  Neurological:  Nonfocal.  Cranial nerves 2-12 grossly intact sensorimotor grossly intact.  Skin:  No rash no lesions  Psychiatric:  Pleasant normal affect    Lab Results   Component Value Date    WBC 5.14 12/06/2019    HGB 12.3 12/06/2019    HCT 36.6 (L) 12/06/2019     (H) 12/06/2019     12/06/2019     CMP  Sodium   Date Value Ref Range Status   09/10/2020 144 136 - 145 mmol/L Final   06/16/2015 146 (H) 137 - 145 MMOL/L Final     Potassium   Date Value Ref Range Status   09/10/2020 4.0 3.5 - 5.1 mmol/L Final   06/16/2015 4.3 3.5 - 5.1 MMOL/L Final     Chloride   Date Value Ref Range Status   09/10/2020 107 95 - 110 mmol/L Final   06/16/2015 106 98 - 107 MMOL/L Final     CO2   Date Value Ref Range Status   09/10/2020 25 23 - 29 mmol/L Final     Glucose   Date Value Ref Range Status   09/10/2020 113 (H) 70 - 110 mg/dL Final     BUN, Bld   Date Value Ref Range Status   09/10/2020 28 (H) 8 - 23 mg/dL Final      Creatinine   Date Value Ref Range Status   09/10/2020 1.5 (H) 0.5 - 1.4 mg/dL Final   06/16/2015 1.00 0.52 - 1.04 MG/DL Final     Calcium   Date Value Ref Range Status   09/10/2020 8.8 8.7 - 10.5 mg/dL Final     Total Protein   Date Value Ref Range Status   09/10/2020 6.8 6.0 - 8.4 g/dL Final     Albumin   Date Value Ref Range Status   09/10/2020 4.0 3.5 - 5.2 g/dL Final   06/16/2015 4.0 3.5 - 5.0 G/DL Final     Total Bilirubin   Date Value Ref Range Status   09/10/2020 0.7 0.1 - 1.0 mg/dL Final     Comment:     For infants and newborns, interpretation of results should be based  on gestational age, weight and in agreement with clinical  observations.  Premature Infant recommended reference ranges:  Up to 24 hours.............<8.0 mg/dL  Up to 48 hours............<12.0 mg/dL  3-5 days..................<15.0 mg/dL  6-29 days.................<15.0 mg/dL       Alkaline Phosphatase   Date Value Ref Range Status   09/10/2020 74 55 - 135 U/L Final     AST ([place])   Date Value Ref Range Status   03/30/2016 36 14 - 36 U/L Final     AST   Date Value Ref Range Status   09/10/2020 23 10 - 40 U/L Final     ALT   Date Value Ref Range Status   09/10/2020 12 10 - 44 U/L Final     Anion Gap   Date Value Ref Range Status   09/10/2020 12 8 - 16 mmol/L Final     eGFR if    Date Value Ref Range Status   09/10/2020 37 (A) >60 mL/min/1.73 m^2 Final     eGFR if non    Date Value Ref Range Status   09/10/2020 32 (A) >60 mL/min/1.73 m^2 Final     Comment:     Calculation used to obtain the estimated glomerular filtration  rate (eGFR) is the CKD-EPI equation.             Osteopenia of the left hip, normal bone mineral density of the lumbar spine    Consider FDA approved medical therapies in postmenopausal women and men aged 50 years and older, based on the following:    *A hip or vertebral (clinical or morphometric) fracture  *T score less than or equal to -2.5 at the femoral neck or spine after  appropriate evaluation to exclude secondary causes.  *Low bone mass -- also known as osteopenia (T score between -1.0 and -2.5 at the femoral neck or spine) and a 10 year probability of hip fracture greater than or equal to 3% or a 10 year probability of major osteoporosis-related fracture greater than or equal to 20% based on the [place]-adapted WHO algorithm.  *Clinicians judgment and/or patient preference may indicate treatment for people with 10 year fracture probabilities is above or below these levels.      Assessment Plan    [] [id number] ER/OR positive HER2 negative right breast cancer invasive ductal carcinoma.  Status post simple mastectomy with lymph node dissection.  Negative lymphovascular invasion, negative margin, lymph node 5/5 negative.    Continue Arimidex plan for total 5 years (Arimidex started on September 2019.)    [] Continue all other routine surveillance protocol    [] Mammogram  > Mammogram 9/10/20 negative      [] Calcium vitamin daily    [] RTC 3 months with laboratory studies.    M*Modal dictation

## 2020-12-11 ENCOUNTER — OFFICE VISIT (OUTPATIENT)
Dept: HEMATOLOGY/ONCOLOGY | Facility: CLINIC | Age: 85
End: 2020-12-11
Payer: MEDICARE

## 2020-12-11 ENCOUNTER — LAB VISIT (OUTPATIENT)
Dept: LAB | Facility: HOSPITAL | Age: 85
End: 2020-12-11
Attending: INTERNAL MEDICINE
Payer: MEDICARE

## 2020-12-11 VITALS
OXYGEN SATURATION: 99 % | DIASTOLIC BLOOD PRESSURE: 67 MMHG | HEART RATE: 66 BPM | HEIGHT: 64 IN | RESPIRATION RATE: 18 BRPM | WEIGHT: 121.38 LBS | SYSTOLIC BLOOD PRESSURE: 137 MMHG | BODY MASS INDEX: 20.72 KG/M2 | TEMPERATURE: 98 F

## 2020-12-11 DIAGNOSIS — Z51.81 VISIT FOR MONITORING ARIMIDEX THERAPY: Primary | ICD-10-CM

## 2020-12-11 DIAGNOSIS — Z79.811 USE OF ANASTROZOLE (ARIMIDEX): ICD-10-CM

## 2020-12-11 DIAGNOSIS — C50.011 MALIGNANT NEOPLASM OF NIPPLE OF RIGHT BREAST IN FEMALE, ESTROGEN RECEPTOR POSITIVE: ICD-10-CM

## 2020-12-11 DIAGNOSIS — M81.0 OSTEOPOROSIS, UNSPECIFIED OSTEOPOROSIS TYPE, UNSPECIFIED PATHOLOGICAL FRACTURE PRESENCE: ICD-10-CM

## 2020-12-11 DIAGNOSIS — Z09 ONCOLOGY FOLLOW-UP ENCOUNTER: ICD-10-CM

## 2020-12-11 DIAGNOSIS — I10 ESSENTIAL HYPERTENSION: ICD-10-CM

## 2020-12-11 DIAGNOSIS — Z79.811 VISIT FOR MONITORING ARIMIDEX THERAPY: Primary | ICD-10-CM

## 2020-12-11 DIAGNOSIS — M85.80 OSTEOPENIA, UNSPECIFIED LOCATION: ICD-10-CM

## 2020-12-11 DIAGNOSIS — Z17.0 MALIGNANT NEOPLASM OF NIPPLE OF RIGHT BREAST IN FEMALE, ESTROGEN RECEPTOR POSITIVE: ICD-10-CM

## 2020-12-11 LAB
ALBUMIN SERPL BCP-MCNC: 4.4 G/DL (ref 3.5–5.2)
ALP SERPL-CCNC: 74 U/L (ref 38–145)
ALT SERPL W/O P-5'-P-CCNC: 21 U/L (ref 0–35)
ANION GAP SERPL CALC-SCNC: 7 MMOL/L (ref 8–16)
AST SERPL-CCNC: 39 U/L (ref 14–36)
BASOPHILS # BLD AUTO: 0.01 K/UL (ref 0–0.2)
BASOPHILS NFR BLD: 0.3 % (ref 0–1.9)
BILIRUB SERPL-MCNC: 0.9 MG/DL (ref 0.2–1.3)
CALCIUM SERPL-MCNC: 9.5 MG/DL (ref 8.4–10.2)
CHLORIDE SERPL-SCNC: 105 MMOL/L (ref 95–110)
CO2 SERPL-SCNC: 32 MMOL/L (ref 22–31)
CREAT SERPL-MCNC: 1.23 MG/DL (ref 0.5–1.4)
DIFFERENTIAL METHOD: ABNORMAL
EOSINOPHIL # BLD AUTO: 0 K/UL (ref 0–0.5)
EOSINOPHIL NFR BLD: 0.5 % (ref 0–8)
ERYTHROCYTE [DISTWIDTH] IN BLOOD BY AUTOMATED COUNT: 12 % (ref 11.5–14.5)
EST. GFR  (AFRICAN AMERICAN): 47 ML/MIN/1.73 M^2
EST. GFR  (NON AFRICAN AMERICAN): 41 ML/MIN/1.73 M^2
GLUCOSE SERPL-MCNC: 116 MG/DL (ref 70–110)
HCT VFR BLD AUTO: 37.9 % (ref 37–48.5)
HGB BLD-MCNC: 12.5 G/DL (ref 12–16)
IMM GRANULOCYTES # BLD AUTO: 0 K/UL (ref 0–0.04)
IMM GRANULOCYTES NFR BLD AUTO: 0 % (ref 0–0.5)
LYMPHOCYTES # BLD AUTO: 0.8 K/UL (ref 1–4.8)
LYMPHOCYTES NFR BLD: 19.9 % (ref 18–48)
MCH RBC QN AUTO: 34.2 PG (ref 27–31)
MCHC RBC AUTO-ENTMCNC: 33 G/DL (ref 32–36)
MCV RBC AUTO: 104 FL (ref 82–98)
MONOCYTES # BLD AUTO: 0.3 K/UL (ref 0.3–1)
MONOCYTES NFR BLD: 8 % (ref 4–15)
NEUTROPHILS # BLD AUTO: 2.8 K/UL (ref 1.8–7.7)
NEUTROPHILS NFR BLD: 71.3 % (ref 38–73)
NRBC BLD-RTO: 0 /100 WBC
PLATELET # BLD AUTO: 183 K/UL (ref 150–350)
PMV BLD AUTO: 10.1 FL (ref 9.2–12.9)
POTASSIUM SERPL-SCNC: 3.9 MMOL/L (ref 3.5–5.1)
PROT SERPL-MCNC: 7.3 G/DL (ref 6–8.4)
RBC # BLD AUTO: 3.66 M/UL (ref 4–5.4)
SODIUM SERPL-SCNC: 144 MMOL/L (ref 136–145)
UUN UR-MCNC: 22 MG/DL (ref 7–18)
WBC # BLD AUTO: 3.87 K/UL (ref 3.9–12.7)

## 2020-12-11 PROCEDURE — 99999 PR PBB SHADOW E&M-EST. PATIENT-LVL IV: ICD-10-PCS | Mod: PBBFAC,,, | Performed by: INTERNAL MEDICINE

## 2020-12-11 PROCEDURE — 99214 OFFICE O/P EST MOD 30 MIN: CPT | Mod: PBBFAC,PN | Performed by: INTERNAL MEDICINE

## 2020-12-11 PROCEDURE — 36415 COLL VENOUS BLD VENIPUNCTURE: CPT | Mod: PN

## 2020-12-11 PROCEDURE — 99214 PR OFFICE/OUTPT VISIT, EST, LEVL IV, 30-39 MIN: ICD-10-PCS | Mod: S$PBB,,, | Performed by: INTERNAL MEDICINE

## 2020-12-11 PROCEDURE — 85025 COMPLETE CBC W/AUTO DIFF WBC: CPT | Mod: PN

## 2020-12-11 PROCEDURE — 85025 COMPLETE CBC W/AUTO DIFF WBC: CPT

## 2020-12-11 PROCEDURE — 99214 OFFICE O/P EST MOD 30 MIN: CPT | Mod: S$PBB,,, | Performed by: INTERNAL MEDICINE

## 2020-12-11 PROCEDURE — 80053 COMPREHEN METABOLIC PANEL: CPT

## 2020-12-11 PROCEDURE — 99999 PR PBB SHADOW E&M-EST. PATIENT-LVL IV: CPT | Mod: PBBFAC,,, | Performed by: INTERNAL MEDICINE

## 2020-12-11 PROCEDURE — 80053 COMPREHEN METABOLIC PANEL: CPT | Mod: PN

## 2021-04-14 ENCOUNTER — TELEPHONE (OUTPATIENT)
Dept: HEMATOLOGY/ONCOLOGY | Facility: CLINIC | Age: 86
End: 2021-04-14

## 2021-06-14 ENCOUNTER — LAB VISIT (OUTPATIENT)
Dept: LAB | Facility: HOSPITAL | Age: 86
End: 2021-06-14
Attending: INTERNAL MEDICINE
Payer: MEDICARE

## 2021-06-14 ENCOUNTER — OFFICE VISIT (OUTPATIENT)
Dept: HEMATOLOGY/ONCOLOGY | Facility: CLINIC | Age: 86
End: 2021-06-14
Payer: MEDICARE

## 2021-06-14 VITALS
OXYGEN SATURATION: 98 % | WEIGHT: 120.13 LBS | HEART RATE: 74 BPM | SYSTOLIC BLOOD PRESSURE: 130 MMHG | DIASTOLIC BLOOD PRESSURE: 80 MMHG | TEMPERATURE: 98 F | BODY MASS INDEX: 20.62 KG/M2

## 2021-06-14 DIAGNOSIS — Z12.31 ENCOUNTER FOR SCREENING MAMMOGRAM FOR MALIGNANT NEOPLASM OF BREAST: ICD-10-CM

## 2021-06-14 DIAGNOSIS — Z79.811 VISIT FOR MONITORING ARIMIDEX THERAPY: ICD-10-CM

## 2021-06-14 DIAGNOSIS — M19.90 OSTEOARTHRITIS, UNSPECIFIED OSTEOARTHRITIS TYPE, UNSPECIFIED SITE: ICD-10-CM

## 2021-06-14 DIAGNOSIS — C50.911 MALIGNANT NEOPLASM OF RIGHT BREAST IN FEMALE, ESTROGEN RECEPTOR POSITIVE, UNSPECIFIED SITE OF BREAST: Primary | ICD-10-CM

## 2021-06-14 DIAGNOSIS — I10 ESSENTIAL HYPERTENSION: ICD-10-CM

## 2021-06-14 DIAGNOSIS — Z09 ONCOLOGY FOLLOW-UP ENCOUNTER: ICD-10-CM

## 2021-06-14 DIAGNOSIS — M85.80 OSTEOPENIA, UNSPECIFIED LOCATION: ICD-10-CM

## 2021-06-14 DIAGNOSIS — Z51.81 VISIT FOR MONITORING ARIMIDEX THERAPY: ICD-10-CM

## 2021-06-14 DIAGNOSIS — Z17.0 MALIGNANT NEOPLASM OF RIGHT BREAST IN FEMALE, ESTROGEN RECEPTOR POSITIVE, UNSPECIFIED SITE OF BREAST: Primary | ICD-10-CM

## 2021-06-14 DIAGNOSIS — M81.0 AGE-RELATED OSTEOPOROSIS WITHOUT CURRENT PATHOLOGICAL FRACTURE: ICD-10-CM

## 2021-06-14 LAB
ALBUMIN SERPL BCP-MCNC: 3.8 G/DL (ref 3.5–5.2)
ALP SERPL-CCNC: 74 U/L (ref 55–135)
ALT SERPL W/O P-5'-P-CCNC: 19 U/L (ref 10–44)
ANION GAP SERPL CALC-SCNC: 8 MMOL/L (ref 8–16)
AST SERPL-CCNC: 25 U/L (ref 10–40)
BASOPHILS # BLD AUTO: 0.01 K/UL (ref 0–0.2)
BASOPHILS NFR BLD: 0.2 % (ref 0–1.9)
BILIRUB SERPL-MCNC: 1.3 MG/DL (ref 0.1–1)
BUN SERPL-MCNC: 33 MG/DL (ref 8–23)
CALCIUM SERPL-MCNC: 9.2 MG/DL (ref 8.7–10.5)
CHLORIDE SERPL-SCNC: 109 MMOL/L (ref 95–110)
CO2 SERPL-SCNC: 27 MMOL/L (ref 23–29)
CREAT SERPL-MCNC: 1.2 MG/DL (ref 0.5–1.4)
DIFFERENTIAL METHOD: ABNORMAL
EOSINOPHIL # BLD AUTO: 0.1 K/UL (ref 0–0.5)
EOSINOPHIL NFR BLD: 1.6 % (ref 0–8)
ERYTHROCYTE [DISTWIDTH] IN BLOOD BY AUTOMATED COUNT: 12 % (ref 11.5–14.5)
EST. GFR  (AFRICAN AMERICAN): 48 ML/MIN/1.73 M^2
EST. GFR  (NON AFRICAN AMERICAN): 42 ML/MIN/1.73 M^2
GLUCOSE SERPL-MCNC: 132 MG/DL (ref 70–110)
HCT VFR BLD AUTO: 36.5 % (ref 37–48.5)
HGB BLD-MCNC: 12.1 G/DL (ref 12–16)
IMM GRANULOCYTES # BLD AUTO: 0.01 K/UL (ref 0–0.04)
IMM GRANULOCYTES NFR BLD AUTO: 0.2 % (ref 0–0.5)
LYMPHOCYTES # BLD AUTO: 1.2 K/UL (ref 1–4.8)
LYMPHOCYTES NFR BLD: 23.4 % (ref 18–48)
MCH RBC QN AUTO: 33.5 PG (ref 27–31)
MCHC RBC AUTO-ENTMCNC: 33.2 G/DL (ref 32–36)
MCV RBC AUTO: 101 FL (ref 82–98)
MONOCYTES # BLD AUTO: 0.4 K/UL (ref 0.3–1)
MONOCYTES NFR BLD: 8.2 % (ref 4–15)
NEUTROPHILS # BLD AUTO: 3.4 K/UL (ref 1.8–7.7)
NEUTROPHILS NFR BLD: 66.4 % (ref 38–73)
NRBC BLD-RTO: 0 /100 WBC
PLATELET # BLD AUTO: 179 K/UL (ref 150–450)
PMV BLD AUTO: 9.8 FL (ref 9.2–12.9)
POTASSIUM SERPL-SCNC: 4.1 MMOL/L (ref 3.5–5.1)
PROT SERPL-MCNC: 6.8 G/DL (ref 6–8.4)
RBC # BLD AUTO: 3.61 M/UL (ref 4–5.4)
SODIUM SERPL-SCNC: 144 MMOL/L (ref 136–145)
WBC # BLD AUTO: 5.12 K/UL (ref 3.9–12.7)

## 2021-06-14 PROCEDURE — 99214 OFFICE O/P EST MOD 30 MIN: CPT | Mod: ,,, | Performed by: INTERNAL MEDICINE

## 2021-06-14 PROCEDURE — 36415 COLL VENOUS BLD VENIPUNCTURE: CPT | Mod: PN | Performed by: INTERNAL MEDICINE

## 2021-06-14 PROCEDURE — 99213 OFFICE O/P EST LOW 20 MIN: CPT | Mod: PN | Performed by: INTERNAL MEDICINE

## 2021-06-14 PROCEDURE — 85025 COMPLETE CBC W/AUTO DIFF WBC: CPT | Mod: PN | Performed by: INTERNAL MEDICINE

## 2021-06-14 PROCEDURE — 99214 PR OFFICE/OUTPT VISIT, EST, LEVL IV, 30-39 MIN: ICD-10-PCS | Mod: ,,, | Performed by: INTERNAL MEDICINE

## 2021-06-14 PROCEDURE — 80053 COMPREHEN METABOLIC PANEL: CPT | Mod: PN | Performed by: INTERNAL MEDICINE

## 2021-06-16 ENCOUNTER — TELEPHONE (OUTPATIENT)
Dept: INFUSION THERAPY | Facility: HOSPITAL | Age: 86
End: 2021-06-16

## 2021-06-16 ENCOUNTER — TELEPHONE (OUTPATIENT)
Dept: HEMATOLOGY/ONCOLOGY | Facility: CLINIC | Age: 86
End: 2021-06-16

## 2021-06-16 ENCOUNTER — TELEPHONE (OUTPATIENT)
Dept: PSYCHIATRY | Facility: CLINIC | Age: 86
End: 2021-06-16

## 2021-06-16 ENCOUNTER — CLINICAL PSYCHOLOGY (OUTPATIENT)
Dept: PSYCHIATRY | Facility: CLINIC | Age: 86
End: 2021-06-16

## 2021-06-17 ENCOUNTER — TELEPHONE (OUTPATIENT)
Dept: INFUSION THERAPY | Facility: HOSPITAL | Age: 86
End: 2021-06-17

## 2021-08-09 ENCOUNTER — OFFICE VISIT (OUTPATIENT)
Dept: NEUROSURGERY | Facility: CLINIC | Age: 86
End: 2021-08-09
Payer: MEDICARE

## 2021-08-09 VITALS
RESPIRATION RATE: 18 BRPM | HEART RATE: 70 BPM | WEIGHT: 122.56 LBS | SYSTOLIC BLOOD PRESSURE: 122 MMHG | BODY MASS INDEX: 20.92 KG/M2 | HEIGHT: 64 IN | DIASTOLIC BLOOD PRESSURE: 70 MMHG

## 2021-08-09 DIAGNOSIS — E23.6 RATHKE'S CLEFT CYST: Primary | ICD-10-CM

## 2021-08-09 DIAGNOSIS — D42.9 MENINGIOMA, MULTIPLE: ICD-10-CM

## 2021-08-09 PROCEDURE — 99214 OFFICE O/P EST MOD 30 MIN: CPT | Mod: PBBFAC,PN | Performed by: NEUROLOGICAL SURGERY

## 2021-08-09 PROCEDURE — 99205 PR OFFICE/OUTPT VISIT, NEW, LEVL V, 60-74 MIN: ICD-10-PCS | Mod: S$PBB,,, | Performed by: NEUROLOGICAL SURGERY

## 2021-08-09 PROCEDURE — 99999 PR PBB SHADOW E&M-EST. PATIENT-LVL IV: ICD-10-PCS | Mod: PBBFAC,,, | Performed by: NEUROLOGICAL SURGERY

## 2021-08-09 PROCEDURE — 99205 OFFICE O/P NEW HI 60 MIN: CPT | Mod: S$PBB,,, | Performed by: NEUROLOGICAL SURGERY

## 2021-08-09 PROCEDURE — 99999 PR PBB SHADOW E&M-EST. PATIENT-LVL IV: CPT | Mod: PBBFAC,,, | Performed by: NEUROLOGICAL SURGERY

## 2021-09-13 ENCOUNTER — HOSPITAL ENCOUNTER (OUTPATIENT)
Dept: RADIOLOGY | Facility: HOSPITAL | Age: 86
Discharge: HOME | End: 2021-09-13
Attending: INTERNAL MEDICINE
Payer: MEDICARE

## 2021-09-13 DIAGNOSIS — M19.90 OSTEOARTHRITIS, UNSPECIFIED OSTEOARTHRITIS TYPE, UNSPECIFIED SITE: ICD-10-CM

## 2021-09-13 DIAGNOSIS — M85.80 OSTEOPENIA, UNSPECIFIED LOCATION: ICD-10-CM

## 2021-09-13 DIAGNOSIS — M81.0 AGE-RELATED OSTEOPOROSIS WITHOUT CURRENT PATHOLOGICAL FRACTURE: ICD-10-CM

## 2021-09-13 DIAGNOSIS — Z12.31 ENCOUNTER FOR SCREENING MAMMOGRAM FOR MALIGNANT NEOPLASM OF BREAST: ICD-10-CM

## 2021-09-13 DIAGNOSIS — Z17.0 MALIGNANT NEOPLASM OF RIGHT BREAST IN FEMALE, ESTROGEN RECEPTOR POSITIVE, UNSPECIFIED SITE OF BREAST: ICD-10-CM

## 2021-09-13 DIAGNOSIS — C50.911 MALIGNANT NEOPLASM OF RIGHT BREAST IN FEMALE, ESTROGEN RECEPTOR POSITIVE, UNSPECIFIED SITE OF BREAST: ICD-10-CM

## 2021-09-13 PROCEDURE — 77080 DXA BONE DENSITY AXIAL: CPT | Mod: TC,PO

## 2021-09-13 PROCEDURE — 77063 BREAST TOMOSYNTHESIS BI: CPT | Mod: 26,52,, | Performed by: RADIOLOGY

## 2021-09-13 PROCEDURE — 77063 MAMMO DIGITAL SCREENING LEFT WITH TOMO: ICD-10-PCS | Mod: 26,52,, | Performed by: RADIOLOGY

## 2021-09-13 PROCEDURE — 77067 MAMMO DIGITAL SCREENING LEFT WITH TOMO: ICD-10-PCS | Mod: 26,52,, | Performed by: RADIOLOGY

## 2021-09-13 PROCEDURE — 77067 SCR MAMMO BI INCL CAD: CPT | Mod: 26,52,, | Performed by: RADIOLOGY

## 2021-09-13 PROCEDURE — 77067 SCR MAMMO BI INCL CAD: CPT | Mod: TC,PO

## 2021-09-13 PROCEDURE — 77080 DEXA BONE DENSITY SPINE HIP: ICD-10-PCS | Mod: 26,,, | Performed by: RADIOLOGY

## 2021-09-13 PROCEDURE — 77080 DXA BONE DENSITY AXIAL: CPT | Mod: 26,,, | Performed by: RADIOLOGY

## 2021-09-22 ENCOUNTER — OFFICE VISIT (OUTPATIENT)
Dept: HEMATOLOGY/ONCOLOGY | Facility: CLINIC | Age: 86
End: 2021-09-22
Payer: MEDICARE

## 2021-09-22 VITALS
TEMPERATURE: 98 F | RESPIRATION RATE: 18 BRPM | DIASTOLIC BLOOD PRESSURE: 72 MMHG | HEIGHT: 64 IN | HEART RATE: 98 BPM | OXYGEN SATURATION: 98 % | SYSTOLIC BLOOD PRESSURE: 132 MMHG | BODY MASS INDEX: 20.95 KG/M2 | WEIGHT: 122.69 LBS

## 2021-09-22 DIAGNOSIS — Z51.81 VISIT FOR MONITORING ARIMIDEX THERAPY: Primary | ICD-10-CM

## 2021-09-22 DIAGNOSIS — Z79.811 VISIT FOR MONITORING ARIMIDEX THERAPY: Primary | ICD-10-CM

## 2021-09-22 DIAGNOSIS — Z79.811 USE OF ANASTROZOLE (ARIMIDEX): ICD-10-CM

## 2021-09-22 DIAGNOSIS — M81.0 OSTEOPOROSIS, UNSPECIFIED OSTEOPOROSIS TYPE, UNSPECIFIED PATHOLOGICAL FRACTURE PRESENCE: ICD-10-CM

## 2021-09-22 PROCEDURE — 99214 OFFICE O/P EST MOD 30 MIN: CPT | Mod: PBBFAC,PN | Performed by: INTERNAL MEDICINE

## 2021-09-22 PROCEDURE — 99999 PR PBB SHADOW E&M-EST. PATIENT-LVL IV: ICD-10-PCS | Mod: PBBFAC,,, | Performed by: INTERNAL MEDICINE

## 2021-09-22 PROCEDURE — 99214 OFFICE O/P EST MOD 30 MIN: CPT | Mod: S$PBB,,, | Performed by: INTERNAL MEDICINE

## 2021-09-22 PROCEDURE — 99214 PR OFFICE/OUTPT VISIT, EST, LEVL IV, 30-39 MIN: ICD-10-PCS | Mod: S$PBB,,, | Performed by: INTERNAL MEDICINE

## 2021-09-22 PROCEDURE — 99999 PR PBB SHADOW E&M-EST. PATIENT-LVL IV: CPT | Mod: PBBFAC,,, | Performed by: INTERNAL MEDICINE

## 2021-09-22 RX ORDER — AMLODIPINE BESYLATE 10 MG/1
10 TABLET ORAL DAILY
COMMUNITY
Start: 2021-06-30

## 2021-09-22 RX ORDER — SERTRALINE HYDROCHLORIDE 100 MG/1
TABLET, FILM COATED ORAL
COMMUNITY
Start: 2021-06-30

## 2021-09-22 RX ORDER — LISINOPRIL 40 MG/1
40 TABLET ORAL DAILY
COMMUNITY
Start: 2021-06-30

## 2021-11-17 DIAGNOSIS — C50.011 MALIGNANT NEOPLASM OF NIPPLE OF RIGHT BREAST IN FEMALE, ESTROGEN RECEPTOR POSITIVE: ICD-10-CM

## 2021-11-17 DIAGNOSIS — Z17.0 MALIGNANT NEOPLASM OF NIPPLE OF RIGHT BREAST IN FEMALE, ESTROGEN RECEPTOR POSITIVE: ICD-10-CM

## 2021-11-17 RX ORDER — ANASTROZOLE 1 MG/1
1 TABLET ORAL DAILY
Qty: 90 TABLET | Refills: 0 | Status: SHIPPED | OUTPATIENT
Start: 2021-11-17

## 2021-12-02 ENCOUNTER — OFFICE VISIT (OUTPATIENT)
Dept: OBSTETRICS AND GYNECOLOGY | Facility: CLINIC | Age: 86
End: 2021-12-02
Payer: MEDICARE

## 2021-12-02 VITALS
SYSTOLIC BLOOD PRESSURE: 136 MMHG | BODY MASS INDEX: 21.98 KG/M2 | DIASTOLIC BLOOD PRESSURE: 76 MMHG | HEIGHT: 64 IN | WEIGHT: 128.75 LBS

## 2021-12-02 DIAGNOSIS — B37.31 YEAST VAGINITIS: Primary | ICD-10-CM

## 2021-12-02 PROCEDURE — 99999 PR PBB SHADOW E&M-EST. PATIENT-LVL III: ICD-10-PCS | Mod: PBBFAC,,, | Performed by: SPECIALIST

## 2021-12-02 PROCEDURE — 99213 OFFICE O/P EST LOW 20 MIN: CPT | Mod: PBBFAC,PN | Performed by: SPECIALIST

## 2021-12-02 PROCEDURE — 99213 OFFICE O/P EST LOW 20 MIN: CPT | Mod: S$PBB,,, | Performed by: SPECIALIST

## 2021-12-02 PROCEDURE — 99213 PR OFFICE/OUTPT VISIT, EST, LEVL III, 20-29 MIN: ICD-10-PCS | Mod: S$PBB,,, | Performed by: SPECIALIST

## 2021-12-02 PROCEDURE — 99999 PR PBB SHADOW E&M-EST. PATIENT-LVL III: CPT | Mod: PBBFAC,,, | Performed by: SPECIALIST

## 2021-12-02 RX ORDER — TERCONAZOLE 8 MG/G
1 CREAM VAGINAL NIGHTLY
Qty: 20 G | Refills: 0 | Status: SHIPPED | OUTPATIENT
Start: 2021-12-02

## 2022-03-23 ENCOUNTER — TELEPHONE (OUTPATIENT)
Dept: HEMATOLOGY/ONCOLOGY | Facility: CLINIC | Age: 87
End: 2022-03-23
Payer: MEDICARE

## 2022-03-23 NOTE — TELEPHONE ENCOUNTER
Outgoing call to patient's son to reschedule the patient's missed appointment from this morning. Son stated that the patient had passed away in January of this year. Will notify Epic of this change.

## 2022-06-27 NOTE — TELEPHONE ENCOUNTER
----- Message from Nury Carey RN sent at 5/14/2019  4:52 PM CDT -----  Contact: self       ----- Message -----  From: Jeremy Seo  Sent: 5/14/2019   1:39 PM  To: [facility] Staff    Patient want to speak with a nurse regarding scheduling appointment please call back at [phone] (home)     
Attempted to call pt to reschedule appt. No answer.  LM w office number included for rtn call.  
with patient

## (undated) DEVICE — SEE MEDLINE ITEM 146231

## (undated) DEVICE — NDL SAFETY 21G X 1 1/2 ECLPSE

## (undated) DEVICE — DRAIN SINGLE ROUND 3/16 15F

## (undated) DEVICE — ELECTRODE BLADE INSULATED 1 IN

## (undated) DEVICE — SYR 10CC LUER LOCK

## (undated) DEVICE — SUT 2-0 12-18IN SILK

## (undated) DEVICE — APPLIER CLIP LIAGCLIP 9.375IN

## (undated) DEVICE — GAUZE FLUFF XXLG 36X36 2 PLY

## (undated) DEVICE — SEE MEDLINE ITEM 152622

## (undated) DEVICE — APPLICATOR CHLORAPREP ORN 26ML

## (undated) DEVICE — SUT 3-0 VICRYL / SH (J416)

## (undated) DEVICE — DRESSING TRANS 4X4 TEGADERM

## (undated) DEVICE — SUT 2-0 ETHILON 18 FS

## (undated) DEVICE — DRAPE LAP TIBURON 77X122IN

## (undated) DEVICE — NEOGUARD COVER 4X30CM STERILE

## (undated) DEVICE — TAPE MEDIPORE 3 X 10YD

## (undated) DEVICE — SLEEVE SCD EXPRESS CALF MEDIUM

## (undated) DEVICE — SPONGE IV DRAIN 4X4 STERILE

## (undated) DEVICE — PACK CUSTOM UNIV BASIN SLI

## (undated) DEVICE — SUT SILK 3-0 SH 18IN BLACK

## (undated) DEVICE — SUT 2/0 30IN SILK BLK BRAI

## (undated) DEVICE — DRAPE STERI INSTRUMENT 1018

## (undated) DEVICE — SYR MEDALLION WHITE 1ML

## (undated) DEVICE — GLOVE PROTEXIS PI CLASSIC 7.5

## (undated) DEVICE — LINER SUCTION 3000CC

## (undated) DEVICE — STRAP OR TABLE 5IN X 72IN

## (undated) DEVICE — EVACUATOR WOUND BULB 100CC

## (undated) DEVICE — SUT 3-0 VICRYL SH CR/8 18

## (undated) DEVICE — SPONGE LAP 18X18 PREWASHED

## (undated) DEVICE — SEE MEDLINE ITEM 157117

## (undated) DEVICE — SUT 3-0 VICRYL / LIGAPACK

## (undated) DEVICE — SOL 9P NACL IRR PIC IL

## (undated) DEVICE — SUT MONOCRYL 4-0 PS-2

## (undated) DEVICE — GOWN X-LG STERILE BACK

## (undated) DEVICE — ELECTRODE REM PLYHSV RETURN 9

## (undated) DEVICE — PACK BASIC

## (undated) DEVICE — CLOSURE SKIN STERI STRIP 1/2X4